# Patient Record
Sex: FEMALE | Race: WHITE | URBAN - METROPOLITAN AREA
[De-identification: names, ages, dates, MRNs, and addresses within clinical notes are randomized per-mention and may not be internally consistent; named-entity substitution may affect disease eponyms.]

---

## 2019-08-01 ENCOUNTER — EMERGENCY (EMERGENCY)
Facility: HOSPITAL | Age: 84
LOS: 0 days | Discharge: HOME | End: 2019-08-02
Attending: EMERGENCY MEDICINE
Payer: MEDICARE

## 2019-08-01 VITALS
HEART RATE: 72 BPM | TEMPERATURE: 97 F | RESPIRATION RATE: 18 BRPM | WEIGHT: 134.92 LBS | OXYGEN SATURATION: 98 % | HEIGHT: 60 IN | SYSTOLIC BLOOD PRESSURE: 153 MMHG | DIASTOLIC BLOOD PRESSURE: 65 MMHG

## 2019-08-01 DIAGNOSIS — Z88.0 ALLERGY STATUS TO PENICILLIN: ICD-10-CM

## 2019-08-01 DIAGNOSIS — Z86.73 PERSONAL HISTORY OF TRANSIENT ISCHEMIC ATTACK (TIA), AND CEREBRAL INFARCTION WITHOUT RESIDUAL DEFICITS: ICD-10-CM

## 2019-08-01 DIAGNOSIS — S01.81XA LACERATION WITHOUT FOREIGN BODY OF OTHER PART OF HEAD, INITIAL ENCOUNTER: ICD-10-CM

## 2019-08-01 DIAGNOSIS — E78.5 HYPERLIPIDEMIA, UNSPECIFIED: ICD-10-CM

## 2019-08-01 DIAGNOSIS — Z79.01 LONG TERM (CURRENT) USE OF ANTICOAGULANTS: ICD-10-CM
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
## 2019-08-01 DIAGNOSIS — E11.9 TYPE 2 DIABETES MELLITUS WITHOUT COMPLICATIONS: ICD-10-CM

## 2019-08-01 DIAGNOSIS — Z79.82 LONG TERM (CURRENT) USE OF ASPIRIN: ICD-10-CM
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
## 2019-08-01 DIAGNOSIS — F03.90 UNSPECIFIED DEMENTIA WITHOUT BEHAVIORAL DISTURBANCE: ICD-10-CM

## 2019-08-01 DIAGNOSIS — M50.30 OTHER CERVICAL DISC DEGENERATION, UNSPECIFIED CERVICAL REGION: ICD-10-CM

## 2019-08-01 DIAGNOSIS — S09.90XA UNSPECIFIED INJURY OF HEAD, INITIAL ENCOUNTER: ICD-10-CM

## 2019-08-01 DIAGNOSIS — I67.89 OTHER CEREBROVASCULAR DISEASE: ICD-10-CM
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
## 2019-08-01 LAB
APPEARANCE UR: CLEAR — SIGNIFICANT CHANGE UP
BILIRUB UR-MCNC: NEGATIVE — SIGNIFICANT CHANGE UP
COLOR SPEC: YELLOW — SIGNIFICANT CHANGE UP
DIFF PNL FLD: NEGATIVE — SIGNIFICANT CHANGE UP
GLUCOSE UR QL: NEGATIVE MG/DL — SIGNIFICANT CHANGE UP
KETONES UR-MCNC: NEGATIVE — SIGNIFICANT CHANGE UP
LEUKOCYTE ESTERASE UR-ACNC: NEGATIVE — SIGNIFICANT CHANGE UP
NITRITE UR-MCNC: NEGATIVE — SIGNIFICANT CHANGE UP
PH UR: 5 — SIGNIFICANT CHANGE UP (ref 5–8)
PROT UR-MCNC: NEGATIVE MG/DL — SIGNIFICANT CHANGE UP
SP GR SPEC: 1.03 — HIGH (ref 1.01–1.02)
UROBILINOGEN FLD QL: NEGATIVE MG/DL — SIGNIFICANT CHANGE UP

## 2019-08-01 PROCEDURE — 99285 EMERGENCY DEPT VISIT HI MDM: CPT

## 2019-08-01 PROCEDURE — 71045 X-RAY EXAM CHEST 1 VIEW: CPT | Mod: 26

## 2019-08-01 PROCEDURE — 72170 X-RAY EXAM OF PELVIS: CPT | Mod: 26

## 2019-08-01 PROCEDURE — 93010 ELECTROCARDIOGRAM REPORT: CPT

## 2019-08-01 PROCEDURE — 72125 CT NECK SPINE W/O DYE: CPT | Mod: 26

## 2019-08-01 PROCEDURE — 70450 CT HEAD/BRAIN W/O DYE: CPT | Mod: 26

## 2019-08-01 PROCEDURE — 99053 MED SERV 10PM-8AM 24 HR FAC: CPT

## 2019-08-01 NOTE — ED ADULT TRIAGE NOTE - CHIEF COMPLAINT QUOTE
pt BIBEMS from [facility] s/p mechanical fall + head strike + laceration to forehead, denies LOC. witnessed by staff. pt on eliquis/asa. baseline dementia, hx cva. trauma alert activated at 1750

## 2019-08-01 NOTE — ED ADULT NURSE NOTE - CHIEF COMPLAINT QUOTE
pt BIBEMS from [facility] s/p mechanical fall + head strike + laceration to forehead, denies LOC. witnessed by staff. pt on eliquis/asa. baseline dementia, hx cva. trauma alert activated at 1750
Patient

## 2019-08-01 NOTE — ED PROVIDER NOTE - CARE PLAN
Principal Discharge DX:	Forehead laceration Principal Discharge DX:	Forehead laceration  Secondary Diagnosis:	Head trauma

## 2019-08-01 NOTE — ED PROVIDER NOTE - CARE PROVIDER_API CALL
Saleem Grewal)  Plastic Surgery; Surgery of the Hand  [address] [place] [address], [address]  [place] [address]  Phone: [phone]  Fax: [phone]  Follow Up Time:

## 2019-08-01 NOTE — ED PROVIDER NOTE - OBJECTIVE STATEMENT
89 y/o female with a PMHx of dementia presents to the ED s/p mechanical fall. 89 y/o female with a PMHx of dementia, HLD, DM, s/p CVA presents to the ED s/p mechanical fall. As per EMS, pt hit head. Laceration to head. Pt states she does not remember how she fell. On Eliquis and ASA.

## 2019-08-01 NOTE — ED PROVIDER NOTE - PROGRESS NOTE DETAILS
Mandi Malhotra for ED Attending Dr. Perez: Plastics consulted for head lac Mandi Malhotra for ED Attending Dr. Perez: Spoke to Dr. Suazo [employer], she states she is not credentialed at  but will attempt to find a plastic surgeon to come in Case discussed with Dr. Grewal(Plastic). He will come to see the patient. Pt. endorsed to Dr. Cheney. Spoke to Mikal Cordoba(BLAIR). He states that the patient fell out of her wheelchair and fell forward. It was witness. NO LOC. Pt. signed out to Dr. Cheney. pt wound repaired by plastics.  CBC stable (lost decent amt of blood from facial wound while in ED).  will dc back to facility

## 2019-08-01 NOTE — ED PROVIDER NOTE - MUSCULOSKELETAL, MLM
Spine appears normal, range of motion is not limited, no muscle or joint tenderness. Full ROM ot LE. Pelvis is stable.

## 2019-08-01 NOTE — ED ADULT NURSE NOTE - NSIMPLEMENTINTERV_GEN_ALL_ED
Implemented All Fall with Harm Risk Interventions:  [place] to call system. Call bell, personal items and telephone within reach. Instruct patient to call for assistance. Room bathroom lighting operational. Non-slip footwear when patient is off stretcher. Physically safe environment: no spills, clutter or unnecessary equipment. Stretcher in lowest position, wheels locked, appropriate side rails in place. Provide visual cue, wrist band, yellow gown, etc. Monitor gait and stability. Monitor for mental status changes and reorient to person, place, and time. Review medications for side effects contributing to fall risk. Reinforce activity limits and safety measures with patient and family. Provide visual clues: red socks.

## 2019-08-02 VITALS
SYSTOLIC BLOOD PRESSURE: 118 MMHG | OXYGEN SATURATION: 100 % | TEMPERATURE: 98 F | HEART RATE: 73 BPM | DIASTOLIC BLOOD PRESSURE: 64 MMHG | RESPIRATION RATE: 18 BRPM

## 2019-08-02 LAB
HCT VFR BLD CALC: 36.9 % — SIGNIFICANT CHANGE UP (ref 34.5–45)
HGB BLD-MCNC: 12 G/DL — SIGNIFICANT CHANGE UP (ref 11.5–15.5)
MCHC RBC-ENTMCNC: 30.4 PG — SIGNIFICANT CHANGE UP (ref 27–34)
MCHC RBC-ENTMCNC: 32.5 GM/DL — SIGNIFICANT CHANGE UP (ref 32–36)
MCV RBC AUTO: 93.4 FL — SIGNIFICANT CHANGE UP (ref 80–100)
PLATELET # BLD AUTO: 312 K/UL — SIGNIFICANT CHANGE UP (ref 150–400)
RBC # BLD: 3.95 M/UL — SIGNIFICANT CHANGE UP (ref 3.8–5.2)
RBC # FLD: 12.6 % — SIGNIFICANT CHANGE UP (ref 10.3–14.5)
WBC # BLD: 12.08 K/UL — HIGH (ref 3.8–10.5)
WBC # FLD AUTO: 12.08 K/UL — HIGH (ref 3.8–10.5)

## 2019-08-02 RX ORDER — CEFAZOLIN SODIUM 1 G
1000 VIAL (EA) INJECTION ONCE
Refills: 0 | Status: DISCONTINUED | OUTPATIENT
Start: 2019-08-02 | End: 2019-08-02

## 2019-08-02 RX ORDER — CEFAZOLIN SODIUM 1 G
1000 VIAL (EA) INJECTION ONCE
Refills: 0 | Status: COMPLETED | OUTPATIENT
Start: 2019-08-02 | End: 2019-08-02

## 2019-08-02 RX ADMIN — Medication 1000 MILLIGRAM(S): at 01:34

## 2019-10-05 ENCOUNTER — INPATIENT (INPATIENT)
Facility: HOSPITAL | Age: 84
LOS: 2 days | Discharge: HOSPICE | DRG: 871 | End: 2019-10-08
Attending: HOSPITALIST | Admitting: INTERNAL MEDICINE
Payer: MEDICARE

## 2019-10-05 VITALS
WEIGHT: 134.92 LBS | OXYGEN SATURATION: 94 % | RESPIRATION RATE: 19 BRPM | HEIGHT: 66 IN | DIASTOLIC BLOOD PRESSURE: 37 MMHG | SYSTOLIC BLOOD PRESSURE: 118 MMHG | HEART RATE: 99 BPM

## 2019-10-05 DIAGNOSIS — J18.9 PNEUMONIA, UNSPECIFIED ORGANISM: ICD-10-CM

## 2019-10-05 PROBLEM — E78.5 HYPERLIPIDEMIA, UNSPECIFIED: Chronic | Status: ACTIVE | Noted: 2019-08-01

## 2019-10-05 PROBLEM — F03.90 UNSPECIFIED DEMENTIA WITHOUT BEHAVIORAL DISTURBANCE: Chronic | Status: ACTIVE | Noted: 2019-08-01

## 2019-10-05 PROBLEM — E11.9 TYPE 2 DIABETES MELLITUS WITHOUT COMPLICATIONS: Chronic | Status: ACTIVE | Noted: 2019-08-01

## 2019-10-05 LAB
ALBUMIN SERPL ELPH-MCNC: 1.6 G/DL — LOW (ref 3.3–5)
ALP SERPL-CCNC: 79 U/L — SIGNIFICANT CHANGE UP (ref 40–120)
ALT FLD-CCNC: 29 U/L — SIGNIFICANT CHANGE UP (ref 12–78)
ANION GAP SERPL CALC-SCNC: 10 MMOL/L — SIGNIFICANT CHANGE UP (ref 5–17)
APPEARANCE UR: ABNORMAL
APTT BLD: 28.9 SEC — SIGNIFICANT CHANGE UP (ref 27.5–36.3)
AST SERPL-CCNC: 43 U/L — HIGH (ref 15–37)
BASE EXCESS BLDA CALC-SCNC: -2.2 MMOL/L — LOW (ref -2–2)
BASOPHILS # BLD AUTO: 0 K/UL — SIGNIFICANT CHANGE UP (ref 0–0.2)
BASOPHILS # BLD AUTO: 0.01 K/UL — SIGNIFICANT CHANGE UP (ref 0–0.2)
BASOPHILS NFR BLD AUTO: 0 % — SIGNIFICANT CHANGE UP (ref 0–2)
BASOPHILS NFR BLD AUTO: 0.1 % — SIGNIFICANT CHANGE UP (ref 0–2)
BILIRUB SERPL-MCNC: 0.3 MG/DL — SIGNIFICANT CHANGE UP (ref 0.2–1.2)
BILIRUB UR-MCNC: NEGATIVE — SIGNIFICANT CHANGE UP
BLOOD GAS COMMENTS ARTERIAL: SIGNIFICANT CHANGE UP
BLOOD GAS COMMENTS ARTERIAL: SIGNIFICANT CHANGE UP
BUN SERPL-MCNC: 62 MG/DL — HIGH (ref 7–23)
CALCIUM SERPL-MCNC: 8.9 MG/DL — SIGNIFICANT CHANGE UP (ref 8.5–10.1)
CHLORIDE SERPL-SCNC: 108 MMOL/L — SIGNIFICANT CHANGE UP (ref 96–108)
CO2 SERPL-SCNC: 24 MMOL/L — SIGNIFICANT CHANGE UP (ref 22–31)
COLOR SPEC: YELLOW — SIGNIFICANT CHANGE UP
CREAT SERPL-MCNC: 1.55 MG/DL — HIGH (ref 0.5–1.3)
DIFF PNL FLD: ABNORMAL
EOSINOPHIL # BLD AUTO: 0 K/UL — SIGNIFICANT CHANGE UP (ref 0–0.5)
EOSINOPHIL # BLD AUTO: 0 K/UL — SIGNIFICANT CHANGE UP (ref 0–0.5)
EOSINOPHIL NFR BLD AUTO: 0 % — SIGNIFICANT CHANGE UP (ref 0–6)
EOSINOPHIL NFR BLD AUTO: 0 % — SIGNIFICANT CHANGE UP (ref 0–6)
GAS PNL BLDA: SIGNIFICANT CHANGE UP
GLUCOSE SERPL-MCNC: 181 MG/DL — HIGH (ref 70–99)
GLUCOSE UR QL: NEGATIVE MG/DL — SIGNIFICANT CHANGE UP
GRAM STN FLD: SIGNIFICANT CHANGE UP
HCO3 BLDA-SCNC: 20 MMOL/L — LOW (ref 21–29)
HCT VFR BLD CALC: 34.9 % — SIGNIFICANT CHANGE UP (ref 34.5–45)
HCT VFR BLD CALC: 36.1 % — SIGNIFICANT CHANGE UP (ref 34.5–45)
HGB BLD-MCNC: 11.1 G/DL — LOW (ref 11.5–15.5)
HGB BLD-MCNC: 11.6 G/DL — SIGNIFICANT CHANGE UP (ref 11.5–15.5)
IMM GRANULOCYTES NFR BLD AUTO: 2.6 % — HIGH (ref 0–1.5)
INR BLD: 1.54 RATIO — HIGH (ref 0.88–1.16)
KETONES UR-MCNC: NEGATIVE — SIGNIFICANT CHANGE UP
LACTATE SERPL-SCNC: 3.5 MMOL/L — HIGH (ref 0.7–2)
LEUKOCYTE ESTERASE UR-ACNC: ABNORMAL
LYMPHOCYTES # BLD AUTO: 0.39 K/UL — LOW (ref 1–3.3)
LYMPHOCYTES # BLD AUTO: 0.44 K/UL — LOW (ref 1–3.3)
LYMPHOCYTES # BLD AUTO: 4.9 % — LOW (ref 13–44)
LYMPHOCYTES # BLD AUTO: 5 % — LOW (ref 13–44)
MCHC RBC-ENTMCNC: 29.7 PG — SIGNIFICANT CHANGE UP (ref 27–34)
MCHC RBC-ENTMCNC: 29.7 PG — SIGNIFICANT CHANGE UP (ref 27–34)
MCHC RBC-ENTMCNC: 31.8 GM/DL — LOW (ref 32–36)
MCHC RBC-ENTMCNC: 32.1 GM/DL — SIGNIFICANT CHANGE UP (ref 32–36)
MCV RBC AUTO: 92.3 FL — SIGNIFICANT CHANGE UP (ref 80–100)
MCV RBC AUTO: 93.3 FL — SIGNIFICANT CHANGE UP (ref 80–100)
MONOCYTES # BLD AUTO: 0.1 K/UL — SIGNIFICANT CHANGE UP (ref 0–0.9)
MONOCYTES # BLD AUTO: 0.16 K/UL — SIGNIFICANT CHANGE UP (ref 0–0.9)
MONOCYTES NFR BLD AUTO: 1.1 % — LOW (ref 2–14)
MONOCYTES NFR BLD AUTO: 2 % — SIGNIFICANT CHANGE UP (ref 2–14)
NEUTROPHILS # BLD AUTO: 7.21 K/UL — SIGNIFICANT CHANGE UP (ref 1.8–7.4)
NEUTROPHILS # BLD AUTO: 8.12 K/UL — HIGH (ref 1.8–7.4)
NEUTROPHILS NFR BLD AUTO: 48 % — SIGNIFICANT CHANGE UP (ref 43–77)
NEUTROPHILS NFR BLD AUTO: 91.3 % — HIGH (ref 43–77)
NITRITE UR-MCNC: NEGATIVE — SIGNIFICANT CHANGE UP
NRBC # BLD: SIGNIFICANT CHANGE UP /100 WBCS (ref 0–0)
PCO2 BLDA: 30 MMHG — LOW (ref 32–46)
PH BLDA: 7.45 — SIGNIFICANT CHANGE UP (ref 7.35–7.45)
PH UR: 7 — SIGNIFICANT CHANGE UP (ref 5–8)
PLATELET # BLD AUTO: 291 K/UL — SIGNIFICANT CHANGE UP (ref 150–400)
PLATELET # BLD AUTO: 353 K/UL — SIGNIFICANT CHANGE UP (ref 150–400)
PO2 BLDA: 62 MMHG — LOW (ref 74–108)
POTASSIUM SERPL-MCNC: 4.5 MMOL/L — SIGNIFICANT CHANGE UP (ref 3.5–5.3)
POTASSIUM SERPL-SCNC: 4.5 MMOL/L — SIGNIFICANT CHANGE UP (ref 3.5–5.3)
PROT SERPL-MCNC: 6.1 GM/DL — SIGNIFICANT CHANGE UP (ref 6–8.3)
PROT UR-MCNC: 100 MG/DL
PROTHROM AB SERPL-ACNC: 17.3 SEC — HIGH (ref 10–12.9)
RAPID RVP RESULT: SIGNIFICANT CHANGE UP
RBC # BLD: 3.74 M/UL — LOW (ref 3.8–5.2)
RBC # BLD: 3.91 M/UL — SIGNIFICANT CHANGE UP (ref 3.8–5.2)
RBC # FLD: 14.2 % — SIGNIFICANT CHANGE UP (ref 10.3–14.5)
RBC # FLD: 14.2 % — SIGNIFICANT CHANGE UP (ref 10.3–14.5)
SAO2 % BLDA: 90 % — LOW (ref 92–96)
SODIUM SERPL-SCNC: 142 MMOL/L — SIGNIFICANT CHANGE UP (ref 135–145)
SP GR SPEC: 1 — LOW (ref 1.01–1.02)
SPECIMEN SOURCE: SIGNIFICANT CHANGE UP
UROBILINOGEN FLD QL: NEGATIVE MG/DL — SIGNIFICANT CHANGE UP
WBC # BLD: 7.75 K/UL — SIGNIFICANT CHANGE UP (ref 3.8–10.5)
WBC # BLD: 8.9 K/UL — SIGNIFICANT CHANGE UP (ref 3.8–10.5)
WBC # FLD AUTO: 7.75 K/UL — SIGNIFICANT CHANGE UP (ref 3.8–10.5)
WBC # FLD AUTO: 8.9 K/UL — SIGNIFICANT CHANGE UP (ref 3.8–10.5)

## 2019-10-05 PROCEDURE — 93010 ELECTROCARDIOGRAM REPORT: CPT

## 2019-10-05 PROCEDURE — 36600 WITHDRAWAL OF ARTERIAL BLOOD: CPT

## 2019-10-05 PROCEDURE — 94640 AIRWAY INHALATION TREATMENT: CPT

## 2019-10-05 PROCEDURE — 82803 BLOOD GASES ANY COMBINATION: CPT

## 2019-10-05 PROCEDURE — 85027 COMPLETE CBC AUTOMATED: CPT

## 2019-10-05 PROCEDURE — 83605 ASSAY OF LACTIC ACID: CPT

## 2019-10-05 PROCEDURE — 94660 CPAP INITIATION&MGMT: CPT

## 2019-10-05 PROCEDURE — 85025 COMPLETE CBC W/AUTO DIFF WBC: CPT

## 2019-10-05 PROCEDURE — 80202 ASSAY OF VANCOMYCIN: CPT

## 2019-10-05 PROCEDURE — 36415 COLL VENOUS BLD VENIPUNCTURE: CPT

## 2019-10-05 PROCEDURE — 93306 TTE W/DOPPLER COMPLETE: CPT

## 2019-10-05 PROCEDURE — 71045 X-RAY EXAM CHEST 1 VIEW: CPT | Mod: 26

## 2019-10-05 PROCEDURE — 80048 BASIC METABOLIC PNL TOTAL CA: CPT

## 2019-10-05 PROCEDURE — 71250 CT THORAX DX C-: CPT | Mod: 26

## 2019-10-05 RX ORDER — LATANOPROST 0.05 MG/ML
1 SOLUTION/ DROPS OPHTHALMIC; TOPICAL AT BEDTIME
Refills: 0 | Status: DISCONTINUED | OUTPATIENT
Start: 2019-10-05 | End: 2019-10-07

## 2019-10-05 RX ORDER — ASPIRIN/CALCIUM CARB/MAGNESIUM 324 MG
81 TABLET ORAL DAILY
Refills: 0 | Status: DISCONTINUED | OUTPATIENT
Start: 2019-10-05 | End: 2019-10-07

## 2019-10-05 RX ORDER — ACETAMINOPHEN 500 MG
2 TABLET ORAL
Qty: 0 | Refills: 0 | DISCHARGE

## 2019-10-05 RX ORDER — ACETAMINOPHEN 500 MG
650 TABLET ORAL EVERY 6 HOURS
Refills: 0 | Status: DISCONTINUED | OUTPATIENT
Start: 2019-10-05 | End: 2019-10-08

## 2019-10-05 RX ORDER — APIXABAN 2.5 MG/1
5 TABLET, FILM COATED ORAL
Refills: 0 | Status: DISCONTINUED | OUTPATIENT
Start: 2019-10-05 | End: 2019-10-07

## 2019-10-05 RX ORDER — AZTREONAM 2 G
1000 VIAL (EA) INJECTION ONCE
Refills: 0 | Status: COMPLETED | OUTPATIENT
Start: 2019-10-05 | End: 2019-10-05

## 2019-10-05 RX ORDER — VANCOMYCIN HCL 1 G
1000 VIAL (EA) INTRAVENOUS ONCE
Refills: 0 | Status: COMPLETED | OUTPATIENT
Start: 2019-10-05 | End: 2019-10-05

## 2019-10-05 RX ORDER — ATENOLOL 25 MG/1
25 TABLET ORAL DAILY
Refills: 0 | Status: DISCONTINUED | OUTPATIENT
Start: 2019-10-05 | End: 2019-10-07

## 2019-10-05 RX ORDER — SODIUM CHLORIDE 9 MG/ML
1500 INJECTION INTRAMUSCULAR; INTRAVENOUS; SUBCUTANEOUS ONCE
Refills: 0 | Status: COMPLETED | OUTPATIENT
Start: 2019-10-05 | End: 2019-10-05

## 2019-10-05 RX ORDER — TRAMADOL HYDROCHLORIDE 50 MG/1
50 TABLET ORAL DAILY
Refills: 0 | Status: DISCONTINUED | OUTPATIENT
Start: 2019-10-05 | End: 2019-10-08

## 2019-10-05 RX ORDER — SERTRALINE 25 MG/1
50 TABLET, FILM COATED ORAL DAILY
Refills: 0 | Status: DISCONTINUED | OUTPATIENT
Start: 2019-10-05 | End: 2019-10-07

## 2019-10-05 RX ORDER — IPRATROPIUM/ALBUTEROL SULFATE 18-103MCG
3 AEROSOL WITH ADAPTER (GRAM) INHALATION ONCE
Refills: 0 | Status: COMPLETED | OUTPATIENT
Start: 2019-10-05 | End: 2019-10-06

## 2019-10-05 RX ORDER — SODIUM CHLORIDE 9 MG/ML
500 INJECTION INTRAMUSCULAR; INTRAVENOUS; SUBCUTANEOUS ONCE
Refills: 0 | Status: COMPLETED | OUTPATIENT
Start: 2019-10-05 | End: 2019-10-05

## 2019-10-05 RX ORDER — IPRATROPIUM/ALBUTEROL SULFATE 18-103MCG
3 AEROSOL WITH ADAPTER (GRAM) INHALATION EVERY 6 HOURS
Refills: 0 | Status: DISCONTINUED | OUTPATIENT
Start: 2019-10-05 | End: 2019-10-08

## 2019-10-05 RX ORDER — IPRATROPIUM/ALBUTEROL SULFATE 18-103MCG
3 AEROSOL WITH ADAPTER (GRAM) INHALATION ONCE
Refills: 0 | Status: DISCONTINUED | OUTPATIENT
Start: 2019-10-05 | End: 2019-10-08

## 2019-10-05 RX ORDER — SODIUM CHLORIDE 9 MG/ML
500 INJECTION INTRAMUSCULAR; INTRAVENOUS; SUBCUTANEOUS
Refills: 0 | Status: COMPLETED | OUTPATIENT
Start: 2019-10-05 | End: 2019-10-05

## 2019-10-05 RX ORDER — ALBUTEROL 90 UG/1
1 AEROSOL, METERED ORAL EVERY 4 HOURS
Refills: 0 | Status: DISCONTINUED | OUTPATIENT
Start: 2019-10-05 | End: 2019-10-08

## 2019-10-05 RX ORDER — ACETAMINOPHEN 500 MG
1000 TABLET ORAL ONCE
Refills: 0 | Status: COMPLETED | OUTPATIENT
Start: 2019-10-05 | End: 2019-10-05

## 2019-10-05 RX ADMIN — Medication 50 MILLIGRAM(S): at 13:30

## 2019-10-05 RX ADMIN — Medication 3 MILLILITER(S): at 17:26

## 2019-10-05 RX ADMIN — Medication 400 MILLIGRAM(S): at 21:44

## 2019-10-05 RX ADMIN — Medication 250 MILLIGRAM(S): at 14:15

## 2019-10-05 RX ADMIN — SODIUM CHLORIDE 150 MILLILITER(S): 9 INJECTION INTRAMUSCULAR; INTRAVENOUS; SUBCUTANEOUS at 22:03

## 2019-10-05 RX ADMIN — SODIUM CHLORIDE 1500 MILLILITER(S): 9 INJECTION INTRAMUSCULAR; INTRAVENOUS; SUBCUTANEOUS at 13:20

## 2019-10-05 RX ADMIN — Medication 3 MILLILITER(S): at 20:09

## 2019-10-05 NOTE — H&P ADULT - HISTORY OF PRESENT ILLNESS
HPI:The patient is a 89 yo female with Hx. of Dementia, CVA, DM, HLD, DVT who was sent to the ED from [facility] for severe dyspnea, hypoxia, lethargy, O2 Sat 74%. The patient was diagnosed with LLL pneumonia on CT and referred for inpatient IV AB.      PMHx:  Dementia, CVA, DM, HLD, DVT    PSHx: N/A     Family Hx: not obtainable secondary to dementia.    Social Hx.: not smoking, no alcohol use    ROS: not obtainable secondary to dementia.        Physical Exam: Vital Signs Last 24 Hrs  T(C): 37.6 (05 Oct 2019 12:30), Max: 37.6 (05 Oct 2019 12:30)  T(F): 99.7 (05 Oct 2019 12:30), Max: 99.7 (05 Oct 2019 12:30)  HR: 82 (05 Oct 2019 17:26) (82 - 99)  BP: 150/64 (05 Oct 2019 17:26) (118/37 - 150/64)  BP(mean): --  RR: 21 (05 Oct 2019 17:26) (19 - 22)  SpO2: 94% (05 Oct 2019 17:26) (92% - 96%)        HEENT: PRRL EOMI    MOUTH/TEETH/GUMS: Clear    NECK: no JVD    LUNGS: decreased BS at bases, rhonchi,     HEART: S1,S2 RR    ABDOMEN: soft nontender    EXTREMITIES:  no pedal edema    MUSCULOSKELETAL: arthritic changes.     NEURO: no tremor, no focal signs.    SKIN: no rash    : CVA negative,     Lab:                       11.6   7.75  )-----------( 353      ( 05 Oct 2019 13:47 )             36.1       10-05    142  |  108  |  62<H>  ----------------------------<  181<H>  4.5   |  24  |  1.55<H>    Ca    8.9      05 Oct 2019 13:47    TPro  6.1  /  Alb  1.6<L>  /  TBili  0.3  /  DBili  x   /  AST  43<H>  /  ALT  29  /  AlkPhos  79  10-05    UA 11-25 wbc,  RVP neg,     CT Chest : Extensive consolidation of the left lung with more mild   consolidation at the right lung base. This could represent extensive   pneumonia. A more aggressive process such as underlying abscess or   perhaps necrosis would be difficult to exclude on the left given the lack   of IV contrast.

## 2019-10-05 NOTE — ED ADULT NURSE NOTE - CHIEF COMPLAINT QUOTE
pt BIBEMS from [facility] for eval of hypoxia, 78% on RA. pt arrives to ED on NRB 94%. pt w/ dementia, hx CVA. lowgrade temp at NH 99.8. at triage unable to obtain oral temp, will perform rectal in room. transfer paperwork lists pt as DNR but no MOLST transferred w/ pt.

## 2019-10-05 NOTE — PROGRESS NOTE ADULT - SUBJECTIVE AND OBJECTIVE BOX
Pt was seen and examined at bedside as RN called to eval. for Hypoxia O2 sat: 86-88 % on Ventimask 50%.   Pt si admitted with LLL PNA, received IV Aztreonam and IV Vancomycin in the ED.   Pt came from ED with Ventimask and sating in high 80's. While in the ED, pt was on NRB sating 94-96%.   Duoneb stat given. ABG stat.     CT Chest in [facility] ED: Extensive consolidation of the left lung with more mild   consolidation at the right lung base. This could represent extensive   pneumonia. A more aggressive process such as underlying abscess or   perhaps necrosis would be difficult to exclude on the left given the lack   of IV contrast.    Vital Signs Last 24 Hrs  T(C): 37.1 (05 Oct 2019 20:00), Max: 37.6 (05 Oct 2019 12:30)  T(F): 98.7 (05 Oct 2019 20:00), Max: 99.7 (05 Oct 2019 12:30)  HR: 88 (05 Oct 2019 20:00) (82 - 99)  BP: 135/54 (05 Oct 2019 20:00) (118/37 - 150/64)  RR: 19 (05 Oct 2019 20:00) (19 - 22)  SpO2: 88% (05 Oct 2019 20:00) (88% - 96%)    Exam:   General: resting, confused  Lungs: b/l rhonchi  LE: no edema    A/P:    # B/L PNA  - C/w IV Abx  - ABG stat  - C/w Oxygen  - Tylenol IVPB x 1 stat  - will start on IV fluid - pt received 1.5 L bolus in the ED  - waiting for ABG result......      Plan d/w RN Pt was seen and examined at bedside as RN called to eval. for Hypoxia O2 sat: 86-88 % on Ventimask 50%.   Pt si admitted with LLL PNA, received IV Aztreonam and IV Vancomycin in the ED.   Pt came from ED with Ventimask and sating in high 80's. While in the ED, pt was on NRB sating 94-96%.   Duoneb stat given. ABG stat.     CT Chest in [facility] ED: Extensive consolidation of the left lung with more mild   consolidation at the right lung base. This could represent extensive   pneumonia. A more aggressive process such as underlying abscess or   perhaps necrosis would be difficult to exclude on the left given the lack   of IV contrast.    Vital Signs Last 24 Hrs  T(C): 37.1 (05 Oct 2019 20:00), Max: 37.6 (05 Oct 2019 12:30)  T(F): 98.7 (05 Oct 2019 20:00), Max: 99.7 (05 Oct 2019 12:30)  HR: 88 (05 Oct 2019 20:00) (82 - 99)  BP: 135/54 (05 Oct 2019 20:00) (118/37 - 150/64)  RR: 19 (05 Oct 2019 20:00) (19 - 22)  SpO2: 88% (05 Oct 2019 20:00) (88% - 96%)    Exam:   General: resting, confused  Lungs: b/l rhonchi  LE: no edema    A/P:    # B/L PNA  - C/w IV Abx  - ABG stat  - C/w Oxygen  - Tylenol IVPB x 1 stat  - will start on IV fluid - pt received 1.5 L bolus in the ED  - waiting for ABG result......  - c/w Nebs      # DVT ppx  - Pt si on Eliquis       Plan d/w MICKY Pt was seen and examined at bedside as RN called to eval. for Hypoxia O2 sat: 86-88 % on Ventimask 50%.   Pt si admitted with LLL PNA, received IV Aztreonam and IV Vancomycin in the ED.   Pt came from ED to  with Ventimask and sating in high 80's. While in the ED, pt was on NRB sating 94-96%.   Duoneb stat given. ABG stat.     89 yo female with Hx. of Dementia, CVA, DM, HLD, DVT who was sent to the ED from [facility] for severe dyspnea, hypoxia, lethargy, O2 Sat 74%. The patient was diagnosed with LLL pneumonia on CT and referred for inpatient IV AB.    CT Chest in the ED: Extensive consolidation of the left lung with more mild   consolidation at the right lung base. This could represent extensive   pneumonia. A more aggressive process such as underlying abscess or   perhaps necrosis would be difficult to exclude on the left given the lack   of IV contrast.    Vital Signs Last 24 Hrs  T(C): 37.1 (05 Oct 2019 20:00), Max: 37.6 (05 Oct 2019 12:30)  T(F): 98.7 (05 Oct 2019 20:00), Max: 99.7 (05 Oct 2019 12:30)  HR: 88 (05 Oct 2019 20:00) (82 - 99)  BP: 135/54 (05 Oct 2019 20:00) (118/37 - 150/64)  RR: 19 (05 Oct 2019 20:00) (19 - 22)  SpO2: 88% (05 Oct 2019 20:00) (88% - 96%)    Exam:   General: resting, confused  Lungs: b/l rhonchi  LE: no edema    A/P:    # B/L PNA  - C/w IV Abx  - ABG stat - results reviewed with dr. Joy and BiPAP ordered, will check  if patient tolerates    - Tylenol IVPB x 1 stat  - will start on IV fluid - pt received 1.5 L bolus in the ED  - c/w Nebs  - Monitor vitals and pulse ox  - Will repeat lactate    # DVT ppx  - Pt si on Eliquis       Plan d/w RN     Case d/w Dr. Joy Pt was seen and examined at bedside as RN called to eval. for Hypoxia O2 sat: 86-88 % on Ventimask 50%.   Pt si admitted with LLL PNA, received IV Aztreonam and IV Vancomycin in the ED.   Pt came from ED to  with Ventimask and sating in high 80's. While in the ED, pt was on NRB sating 94-96%.   Duoneb stat given. ABG stat.     89 yo female with Hx. of Dementia, CVA, DM, HLD, DVT who was sent to the ED from [facility] for severe dyspnea, hypoxia, lethargy, O2 Sat 74%. The patient was diagnosed with LLL pneumonia on CT and referred for inpatient IV AB.    CT Chest in the ED: Extensive consolidation of the left lung with more mild   consolidation at the right lung base. This could represent extensive   pneumonia. A more aggressive process such as underlying abscess or   perhaps necrosis would be difficult to exclude on the left given the lack   of IV contrast.    Vital Signs Last 24 Hrs  T(C): 37.1 (05 Oct 2019 20:00), Max: 37.6 (05 Oct 2019 12:30)  T(F): 98.7 (05 Oct 2019 20:00), Max: 99.7 (05 Oct 2019 12:30)  HR: 88 (05 Oct 2019 20:00) (82 - 99)  BP: 135/54 (05 Oct 2019 20:00) (118/37 - 150/64)  RR: 19 (05 Oct 2019 20:00) (19 - 22)  SpO2: 88% (05 Oct 2019 20:00) (88% - 96%)    Exam:   General: resting, confused  Lungs: b/l rhonchi  LE: no edema    A/P:    # B/L PNA  - C/w IV Abx  - ABG stat - results reviewed with dr. Joy and BiPAP ordered, will check  if patient tolerates    - Tylenol IVPB x 1 stat  - will start on IV fluid - pt received 1.5 L bolus in the ED  - c/w Nebs  - Monitor vitals and pulse ox  - Will repeat lactate    # DVT ppx  - Pt si on Eliquis       Plan d/w RN     Case d/w Dr. Joy     Addendum:  S/p BiPAP sat is still in 80%, d/w RN to call respi. therapist to adjust setting to keep o2 sat atleast 90 %.   I Spoke to Pt's Son Francisco Campos HCP overnight around 23:40 and he confirmed over the phone that Pt is DNR/DNI.   C/w IVF Bolus and then maintenance  Repeat ABG and lactate check  Monitor vitals    Above d/w Dr. Joy Pt was seen and examined at bedside as RN called to eval. for Hypoxia O2 sat: 86-88 % on Ventimask 50%.   Pt si admitted with LLL PNA, received IV Aztreonam and IV Vancomycin in the ED.   Pt came from ED to  with Ventimask and sating in high 80's. While in the ED, pt was on NRB sating 94-96%.   Duoneb stat given. ABG stat.     89 yo female with Hx. of Dementia, CVA, DM, HLD, DVT who was sent to the ED from [facility] for severe dyspnea, hypoxia, lethargy, O2 Sat 74%. The patient was diagnosed with LLL pneumonia on CT and referred for inpatient IV AB.    CT Chest in the ED: Extensive consolidation of the left lung with more mild   consolidation at the right lung base. This could represent extensive   pneumonia. A more aggressive process such as underlying abscess or   perhaps necrosis would be difficult to exclude on the left given the lack   of IV contrast.    Vital Signs Last 24 Hrs  T(C): 37.1 (05 Oct 2019 20:00), Max: 37.6 (05 Oct 2019 12:30)  T(F): 98.7 (05 Oct 2019 20:00), Max: 99.7 (05 Oct 2019 12:30)  HR: 88 (05 Oct 2019 20:00) (82 - 99)  BP: 135/54 (05 Oct 2019 20:00) (118/37 - 150/64)  RR: 19 (05 Oct 2019 20:00) (19 - 22)  SpO2: 88% (05 Oct 2019 20:00) (88% - 96%)    Exam:   General: resting, confused  Lungs: b/l rhonchi  LE: no edema    A/P:    # B/L PNA  # Acute Respi. failure  # Elevated Lactate  - C/w IV Abx  - ABG stat - results reviewed with dr. Joy and BiPAP ordered, will check  if patient tolerates    - Tylenol IVPB x 1 stat  - will start on IV fluid - pt received 1.5 L bolus in the ED  - c/w Nebs  - Monitor vitals and pulse ox  - Will repeat lactate    # DVT ppx  - Pt si on Eliquis       Plan d/w RN     Case d/w Dr. Joy     Addendum:  S/p BiPAP sat is still in 80%, d/w RN to call respi. therapist to adjust setting to keep o2 sat atleast 90 %.   I Spoke to Pt's Son Francisco Campos HCP overnight around 23:40 and he confirmed over the phone that Pt is DNR/DNI.   C/w IVF Bolus and then maintenance  Repeat ABG and lactate check  Monitor vitals    Above d/w Dr. Joy

## 2019-10-05 NOTE — ED ADULT NURSE NOTE - OBJECTIVE STATEMENT
Pt BIBA from [facility] for fever and cough. Sepsis work up done. Pt alert and confused which is baseline.

## 2019-10-05 NOTE — ED PROVIDER NOTE - OBJECTIVE STATEMENT
89 y/o female with a PMHx of Dementia, CVA, DM, HLD, presents to the ED BIBEMS from [facility] c/o difficulty breathing. +cough. As per transfer note, pt is hypoxia, increase lethargy, and O2 of 74% on room air and increased to 88% on O2 and with a temp of 99.8F at facility. Pt presents with DNR. Full HPI is unobtainable due to pt not being alert and orieted, hx obtained by paperwork. 89 y/o female with a PMHx of Dementia, CVA, DM, HLD, [facility]. +cough. As per transfer note, pt is hypoxia, increase lethargy, and O2 of 74% on room air and increased to 88% on O2 and with a temp of 99.8F at facility. Pt presents with DNR. Full HPI is unobtainable due to pt not being alert and oriented, hx obtained by paperwork.

## 2019-10-05 NOTE — ED PROVIDER NOTE - CLINICAL SUMMARY MEDICAL DECISION MAKING FREE TEXT BOX
89 y/o female with cough, low O2 sat, EKG, labs and admit. 89 y/o female with cough, low O2 sat, EKG, cxr, labs and admit.

## 2019-10-05 NOTE — ED ADULT NURSE NOTE - NSIMPLEMENTINTERV_GEN_ALL_ED
Implemented All Fall with Harm Risk Interventions:  [place] to call system. Call bell, personal items and telephone within reach. Instruct patient to call for assistance. Room bathroom lighting operational. Non-slip footwear when patient is off stretcher. Physically safe environment: no spills, clutter or unnecessary equipment. Stretcher in lowest position, wheels locked, appropriate side rails in place. Provide visual cue, wrist band, yellow gown, etc. Monitor gait and stability. Monitor for mental status changes and reorient to person, place, and time. Review medications for side effects contributing to fall risk. Reinforce activity limits and safety measures with patient and family. Provide visual clues: red socks.

## 2019-10-05 NOTE — ED ADULT TRIAGE NOTE - CHIEF COMPLAINT QUOTE
pt BIBEMS from [facility] for eval of hypoxia, 78% on RA. pt arrives to ED on NRB 94%. pt w/ dementia, hx CVA. lowgrade temp at NH 99.8. at triage unable to obtain oral temp, will perform rectal in room. transfer paperwork lists pt as DNR but no MOLST transferred w/ pt.

## 2019-10-05 NOTE — PATIENT PROFILE ADULT - FUNCTIONAL SCREEN CURRENT LEVEL: COMMUNICATION, MLM
3 = unable to speak (not related to language barrier) 3 = unable to understand or speak (not related to language barrier)

## 2019-10-05 NOTE — ED ADULT NURSE REASSESSMENT NOTE - NS ED NURSE REASSESS COMMENT FT1
Patient confused, alert but nonverbal. No s/s of distress present. Hygiene care performed, pt repositioned for comfort & safety. Hand-off report to RN Yeny, transport in progress to . Safety & comfort measures in place, purposeful active rounding completed.

## 2019-10-05 NOTE — H&P ADULT - ASSESSMENT
a 89 yo female with Hx. of Dementia, CVA, DM, HLD, DVT who was sent to the ED from [facility] for severe dyspnea, hypoxia, lethargy, O2 Sat 74%. The patient was diagnosed with LLL pneumonia on CT and referred for inpatient IV AB.  assessment Dx:                       1.LLL Pneumonia r/o sepsis                       2.DVT on Eliquis                       3. DM2                       4.s/p CVA                       5. Demenita     Plan:    admit to medicine               medications: starte on Aztreonam and Vancomycin in ED, continue current meds as per med rec.                VTEP: already on Eliquis               Labs: cbc,bmp                Radiology:CT chest                Cardiac diagnostics: EKG                Consults: ID for IV Ab management.                Advance care planning: the patient has a non Hospital DNR. She is not capable to make DNR decisions.

## 2019-10-06 LAB
ANION GAP SERPL CALC-SCNC: 9 MMOL/L — SIGNIFICANT CHANGE UP (ref 5–17)
BASE EXCESS BLDA CALC-SCNC: -3.7 MMOL/L — LOW (ref -2–2)
BLOOD GAS COMMENTS ARTERIAL: SIGNIFICANT CHANGE UP
BLOOD GAS COMMENTS ARTERIAL: SIGNIFICANT CHANGE UP
BUN SERPL-MCNC: 62 MG/DL — HIGH (ref 7–23)
CALCIUM SERPL-MCNC: 8.3 MG/DL — LOW (ref 8.5–10.1)
CHLORIDE SERPL-SCNC: 114 MMOL/L — HIGH (ref 96–108)
CO2 SERPL-SCNC: 22 MMOL/L — SIGNIFICANT CHANGE UP (ref 22–31)
CREAT SERPL-MCNC: 1.41 MG/DL — HIGH (ref 0.5–1.3)
GAS PNL BLDA: SIGNIFICANT CHANGE UP
GLUCOSE SERPL-MCNC: 86 MG/DL — SIGNIFICANT CHANGE UP (ref 70–99)
GRAM STN FLD: SIGNIFICANT CHANGE UP
HCO3 BLDA-SCNC: 20 MMOL/L — LOW (ref 21–29)
METHOD TYPE: SIGNIFICANT CHANGE UP
PCO2 BLDA: 33 MMHG — SIGNIFICANT CHANGE UP (ref 32–46)
PH BLDA: 7.4 — SIGNIFICANT CHANGE UP (ref 7.35–7.45)
PO2 BLDA: 79 MMHG — SIGNIFICANT CHANGE UP (ref 74–108)
POTASSIUM SERPL-MCNC: 4.4 MMOL/L — SIGNIFICANT CHANGE UP (ref 3.5–5.3)
POTASSIUM SERPL-SCNC: 4.4 MMOL/L — SIGNIFICANT CHANGE UP (ref 3.5–5.3)
S PNEUM DNA BLD POS QL NAA+NON-PROBE: SIGNIFICANT CHANGE UP
SAO2 % BLDA: 95 % — SIGNIFICANT CHANGE UP (ref 92–96)
SODIUM SERPL-SCNC: 145 MMOL/L — SIGNIFICANT CHANGE UP (ref 135–145)

## 2019-10-06 RX ORDER — FUROSEMIDE 40 MG
40 TABLET ORAL DAILY
Refills: 0 | Status: DISCONTINUED | OUTPATIENT
Start: 2019-10-06 | End: 2019-10-08

## 2019-10-06 RX ORDER — AZTREONAM 2 G
1000 VIAL (EA) INJECTION ONCE
Refills: 0 | Status: COMPLETED | OUTPATIENT
Start: 2019-10-06 | End: 2019-10-06

## 2019-10-06 RX ORDER — CEFTRIAXONE 500 MG/1
2000 INJECTION, POWDER, FOR SOLUTION INTRAMUSCULAR; INTRAVENOUS EVERY 24 HOURS
Refills: 0 | Status: DISCONTINUED | OUTPATIENT
Start: 2019-10-06 | End: 2019-10-07

## 2019-10-06 RX ORDER — SODIUM CHLORIDE 9 MG/ML
1000 INJECTION INTRAMUSCULAR; INTRAVENOUS; SUBCUTANEOUS
Refills: 0 | Status: DISCONTINUED | OUTPATIENT
Start: 2019-10-06 | End: 2019-10-07

## 2019-10-06 RX ORDER — AZTREONAM 2 G
1000 VIAL (EA) INJECTION EVERY 8 HOURS
Refills: 0 | Status: DISCONTINUED | OUTPATIENT
Start: 2019-10-06 | End: 2019-10-06

## 2019-10-06 RX ORDER — ROBINUL 0.2 MG/ML
0.2 INJECTION INTRAMUSCULAR; INTRAVENOUS EVERY 6 HOURS
Refills: 0 | Status: DISCONTINUED | OUTPATIENT
Start: 2019-10-06 | End: 2019-10-08

## 2019-10-06 RX ORDER — MORPHINE SULFATE 50 MG/1
2 CAPSULE, EXTENDED RELEASE ORAL EVERY 6 HOURS
Refills: 0 | Status: DISCONTINUED | OUTPATIENT
Start: 2019-10-06 | End: 2019-10-07

## 2019-10-06 RX ORDER — AZTREONAM 2 G
VIAL (EA) INJECTION
Refills: 0 | Status: DISCONTINUED | OUTPATIENT
Start: 2019-10-06 | End: 2019-10-06

## 2019-10-06 RX ORDER — VANCOMYCIN HCL 1 G
500 VIAL (EA) INTRAVENOUS EVERY 12 HOURS
Refills: 0 | Status: DISCONTINUED | OUTPATIENT
Start: 2019-10-06 | End: 2019-10-07

## 2019-10-06 RX ADMIN — Medication 40 MILLIGRAM(S): at 12:54

## 2019-10-06 RX ADMIN — MORPHINE SULFATE 2 MILLIGRAM(S): 50 CAPSULE, EXTENDED RELEASE ORAL at 19:21

## 2019-10-06 RX ADMIN — CEFTRIAXONE 2000 MILLIGRAM(S): 500 INJECTION, POWDER, FOR SOLUTION INTRAMUSCULAR; INTRAVENOUS at 12:20

## 2019-10-06 RX ADMIN — Medication 3 MILLILITER(S): at 19:53

## 2019-10-06 RX ADMIN — ROBINUL 0.2 MILLIGRAM(S): 0.2 INJECTION INTRAMUSCULAR; INTRAVENOUS at 17:20

## 2019-10-06 RX ADMIN — MORPHINE SULFATE 2 MILLIGRAM(S): 50 CAPSULE, EXTENDED RELEASE ORAL at 20:00

## 2019-10-06 RX ADMIN — SODIUM CHLORIDE 1000 MILLILITER(S): 9 INJECTION INTRAMUSCULAR; INTRAVENOUS; SUBCUTANEOUS at 00:23

## 2019-10-06 RX ADMIN — Medication 3 MILLILITER(S): at 00:43

## 2019-10-06 RX ADMIN — Medication 50 MILLIGRAM(S): at 10:26

## 2019-10-06 RX ADMIN — LATANOPROST 1 DROP(S): 0.05 SOLUTION/ DROPS OPHTHALMIC; TOPICAL at 21:37

## 2019-10-06 RX ADMIN — Medication 3 MILLILITER(S): at 08:26

## 2019-10-06 RX ADMIN — Medication 0.5 MILLIGRAM(S): at 17:28

## 2019-10-06 RX ADMIN — Medication 3 MILLILITER(S): at 13:30

## 2019-10-06 RX ADMIN — MORPHINE SULFATE 2 MILLIGRAM(S): 50 CAPSULE, EXTENDED RELEASE ORAL at 13:12

## 2019-10-06 RX ADMIN — SODIUM CHLORIDE 75 MILLILITER(S): 9 INJECTION INTRAMUSCULAR; INTRAVENOUS; SUBCUTANEOUS at 10:10

## 2019-10-06 RX ADMIN — Medication 3 MILLILITER(S): at 01:45

## 2019-10-06 RX ADMIN — MORPHINE SULFATE 2 MILLIGRAM(S): 50 CAPSULE, EXTENDED RELEASE ORAL at 13:42

## 2019-10-06 RX ADMIN — Medication 100 MILLIGRAM(S): at 17:20

## 2019-10-06 NOTE — DIETITIAN INITIAL EVALUATION ADULT. - NAME AND PHONE
Aminah Montes De Oca MA, RDN, CDN, [facility]  [phone] (office number)  [phone] (pager number)

## 2019-10-06 NOTE — DIETITIAN INITIAL EVALUATION ADULT. - FACTORS AFF FOOD INTAKE
unable to obtain information from patient 2/2 dementia and SOB.  no family at bedside and unable to reach family./change in mental status

## 2019-10-06 NOTE — PROGRESS NOTE ADULT - SUBJECTIVE AND OBJECTIVE BOX
Patient is a 89y old  Female who presents with a chief complaint of Pneumonia       SUBJECTIVE:   HPI:  HPI:The patient is a 89 yo female with Hx. of Dementia, CVA, DM, HLD, DVT who was sent to the ED from [facility] for severe dyspnea, hypoxia, lethargy, O2 Sat 74%. The patient was diagnosed with LLL pneumonia on CT and referred for inpatient IV AB.    PT cannot provide any information. Is on bipap, tachypneic and appears extremely weak. Spoke to HCP Francisco via phone, was made aware mother's condition is critical. I discussed option of comfort management Will try abx and cont bipap, adding morphine, ativan, lasix and robinul. IF no improvement and appears worse or unchanged in next 24 hours, he would like to consider pure comfort management MOLST signed and in chart. TT discussing GOC : 25 min.       PMHx:  Dementia, CVA, DM, HLD, DVT    PSHx: N/A     Family Hx: not obtainable secondary to dementia.    Social Hx.: not smoking, no alcohol use    ROS: not obtainable secondary to dementia.        ICU Vital Signs Last 24 Hrs  T(C): 37.8 (06 Oct 2019 09:00), Max: 38.4 (05 Oct 2019 21:24)  T(F): 100 (06 Oct 2019 09:00), Max: 101.2 (05 Oct 2019 21:24)  HR: 107 (06 Oct 2019 13:30) (78 - 121)  BP: 142/68 (06 Oct 2019 12:00) (90/47 - 150/64)  BP(mean): 86 (06 Oct 2019 12:00) (54 - 86)  ABP: --  ABP(mean): --  RR: 38 (06 Oct 2019 12:00) (18 - 38)  SpO2: 90% (06 Oct 2019 13:30) (81% - 96%)        HEENT: PRRL EOMI    MOUTH/TEETH/GUMS: Clear    NECK: no JVD    LUNGS: decreased BS at bases, rhonchi, rales at bases    HEART: S1,S2 RR    ABDOMEN: soft nontender    EXTREMITIES:  no pedal edema    MUSCULOSKELETAL: arthritic changes.     NEURO: no tremor, no focal signs.    SKIN: no rash    : CVA negative,     Lab:                       11.6   7.75  )-----------( 353      ( 05 Oct 2019 13:47 )             36.1       10-05    142  |  108  |  62<H>  ----------------------------<  181<H>  4.5   |  24  |  1.55<H>    Ca    8.9      05 Oct 2019 13:47    TPro  6.1  /  Alb  1.6<L>  /  TBili  0.3  /  DBili  x   /  AST  43<H>  /  ALT  29  /  AlkPhos  79  10-05    UA 11-25 wbc,  RVP neg,     CT Chest : Extensive consolidation of the left lung with more mild   consolidation at the right lung base. This could represent extensive   pneumonia. A more aggressive process such as underlying abscess or   perhaps necrosis would be difficult to exclude on the left given the lack   of IV contrast. (05 Oct 2019 17:30)            LABS: All Labs Reviewed:        Blood Culture: 10-05 @ 13:47  Organism Blood Culture PCR  Gram Stain Blood -- Gram Stain   Growth in anaerobic bottle: Gram Positive Cocci in Pairs and Chains  Growth in aerobic bottle: Gram Positive Cocci in Pairs and Chains  Specimen Source .Blood None  Culture-Blood --        RADIOLOGY/EKG:  < from: CT Chest No Cont (10.05.19 @ 14:52) >    Impression: Extensive consolidation of the left lung with more mild   consolidation at the right lung base. This could represent extensive   pneumonia. A more aggressive process such as underlying abscess or   perhaps necrosis would be difficult to exclude on the left given the lack   of IV contrast.    < end of copied text >

## 2019-10-06 NOTE — CONSULT NOTE ADULT - SUBJECTIVE AND OBJECTIVE BOX
Patient is a 89y old  Female who presents with a chief complaint of Pneumonia (05 Oct 2019 21:25)    HPI:  89 y/o female with h/o dementia, old CVA, DM, HLD, DVT was admitted on 10/5 from [facility] for worsening dyspnea, hypoxia, lethargy, O2 Sat 74%. The patient was diagnosed with LLL pneumonia on CT and referred for inpatient IV AB. In ER she was noted with low grade fever and received aztreonam and vancomycin IV.         CT Chest : Extensive consolidation of the left lung with more mild   consolidation at the right lung base. This could represent extensive   pneumonia. A more aggressive process such as underlying abscess or   perhaps necrosis would be difficult to exclude on the left given the lack   of IV contrast. (05 Oct 2019 17:30)      PMH: as above  PSH: as above  Meds: per reconciliation sheet, noted below  MEDICATIONS  (STANDING):  ALBUTerol    90 MICROgram(s) HFA Inhaler 1 Puff(s) Inhalation every 4 hours  ALBUTerol/ipratropium for Nebulization 3 milliLiter(s) Nebulizer every 6 hours  ALBUTerol/ipratropium for Nebulization. 3 milliLiter(s) Nebulizer once  ALBUTerol/ipratropium for Nebulization. 3 milliLiter(s) Nebulizer once  apixaban 5 milliGRAM(s) Oral two times a day  aspirin enteric coated 81 milliGRAM(s) Oral daily  ATENolol  Tablet 25 milliGRAM(s) Oral daily  aztreonam  IVPB 1000 milliGRAM(s) IV Intermittent every 8 hours  aztreonam  IVPB      latanoprost 0.005% Ophthalmic Solution 1 Drop(s) Both EYES at bedtime  sertraline 50 milliGRAM(s) Oral daily  sodium chloride 0.9%. 1000 milliLiter(s) (75 mL/Hr) IV Continuous <Continuous>    MEDICATIONS  (PRN):  acetaminophen   Tablet .. 650 milliGRAM(s) Oral every 6 hours PRN Temp greater or equal to 38C (100.4F), Mild Pain (1 - 3)  traMADol 50 milliGRAM(s) Oral daily PRN Moderate Pain (4 - 6)    Allergies    penicillins (Unknown)    Intolerances      Social: no smoking, no alcohol, no illegal drugs; no recent travel, no exposure to TB  FAMILY HISTORY:    no history of premature cardiovascular disease in first degree relatives    ROS: the patient denies fever, no chills, no HA, no seizures, no dizziness, no sore throat, no nasal congestion, no blurry vision, no CP, no palpitations, no SOB, no cough, no abdominal pain, no diarrhea, no N/V, no dysuria, no leg pain, no claudication, no rash, no joint aches, no rectal pain or bleeding, no night sweats  All other systems reviewed and are negative    Vital Signs Last 24 Hrs  T(C): 37.8 (06 Oct 2019 09:00), Max: 38.4 (05 Oct 2019 21:24)  T(F): 100 (06 Oct 2019 09:00), Max: 101.2 (05 Oct 2019 21:24)  HR: 104 (06 Oct 2019 10:00) (78 - 104)  BP: 136/73 (06 Oct 2019 10:00) (90/47 - 150/64)  BP(mean): 86 (06 Oct 2019 10:00) (54 - 86)  RR: 35 (06 Oct 2019 10:00) (18 - 35)  SpO2: 92% (06 Oct 2019 10:00) (81% - 96%)  Daily Height in cm: 167.64 (05 Oct 2019 12:19)    Daily Weight in k.9 (06 Oct 2019 09:44)    PE:    Constitutional: frail looking  HEENT: NC/AT, EOMI, PERRLA, conjunctivae clear; ears and nose atraumatic; pharynx benign  Neck: supple; thyroid not palpable  Back: no tenderness  Respiratory: respiratory effort normal; clear to auscultation  Cardiovascular: S1S2 regular, no murmurs  Abdomen: soft, not tender, not distended, positive BS; no liver or spleen organomegaly  Genitourinary: no suprapubic tenderness  Lymphatic: no LN palpable  Musculoskeletal: no muscle tenderness, no joint swelling or tenderness  Extremities: no pedal edema  Neurological/ Psychiatric: confused, judgement and insight impaired; moving all extremities  Skin: no rashes; no palpable lesions    Labs: all available labs reviewed                        .1   90  )-----------( 291      ( 05 Oct 2019 19:35 )             34.9     10-06    145  |  114<H>  |  62<H>  ----------------------------<  86  4.4   |  22  |  1.41<H>    Ca    8.3<L>      06 Oct 2019 06:18    TPro  6.1  /  Alb  1.6<L>  /  TBili  0.3  /  DBili  x   /  AST  43<H>  /  ALT  29  /  AlkPhos  79  10-05     LIVER FUNCTIONS - ( 05 Oct 2019 13:47 )  Alb: 1.6 g/dL / Pro: 6.1 gm/dL / ALK PHOS: 79 U/L / ALT: 29 U/L / AST: 43 U/L / GGT: x           Urinalysis Basic - ( 05 Oct 2019 13:47 )    Color: Yellow / Appearance: Slightly Turbid / S.005 / pH: x  Gluc: x / Ketone: Negative  / Bili: Negative / Urobili: Negative mg/dL   Blood: x / Protein: 100 mg/dL / Nitrite: Negative   Leuk Esterase: Moderate / RBC: 3-5 /HPF / WBC 11-25   Sq Epi: x / Non Sq Epi: Few / Bacteria: Many      Culture - Blood (collected 05 Oct 2019 13:47)  Source: .Blood None  Gram Stain (06 Oct 2019 00:51):    Growth in anaerobic bottle: Gram Positive Cocci in Pairs and Chains    Growth in aerobic bottle: Gram Positive Cocci in Pairs and Chains  Preliminary Report (06 Oct 2019 00:52):    Growth in anaerobic bottle: Gram Positive Cocci in Pairs and Chains    Growth in aerobic bottle: Gram Positive Cocci in Pairs and Chains  Organism: Blood Culture PCR (06 Oct 2019 00:53)      -  Streptococcus pneumoniae: Detec      Method Type: PCR    Radiology: all available radiological tests reviewed    < from: CT Chest No Cont (10.05.19 @ 14:52) >  Extensive consolidation of the left lung with more mild   consolidation at the right lung base. This could represent extensive   pneumonia. A more aggressive process such as underlying abscess or   perhaps necrosis would be difficult to exclude on the left given the lack   of IV contrast.    < end of copied text >      Advanced directives addressed: full resuscitation Patient is a 89y old  Female who presents with a chief complaint of Pneumonia (05 Oct 2019 21:25)    HPI:  89 y/o female with h/o dementia, old CVA, DM, HLD, DVT was admitted on 10/5 from [facility] for worsening dyspnea, hypoxia, lethargy, O2 Sat 74%. The patient was diagnosed with LLL pneumonia on CT and referred for inpatient IV AB. In ER she was noted with low grade fever and received aztreonam and vancomycin IV.         CT Chest : Extensive consolidation of the left lung with more mild   consolidation at the right lung base. This could represent extensive   pneumonia. A more aggressive process such as underlying abscess or   perhaps necrosis would be difficult to exclude on the left given the lack   of IV contrast. (05 Oct 2019 17:30)      PMH: as above  PSH: as above  Meds: per reconciliation sheet, noted below  MEDICATIONS  (STANDING):  ALBUTerol    90 MICROgram(s) HFA Inhaler 1 Puff(s) Inhalation every 4 hours  ALBUTerol/ipratropium for Nebulization 3 milliLiter(s) Nebulizer every 6 hours  ALBUTerol/ipratropium for Nebulization. 3 milliLiter(s) Nebulizer once  ALBUTerol/ipratropium for Nebulization. 3 milliLiter(s) Nebulizer once  apixaban 5 milliGRAM(s) Oral two times a day  aspirin enteric coated 81 milliGRAM(s) Oral daily  ATENolol  Tablet 25 milliGRAM(s) Oral daily  aztreonam  IVPB 1000 milliGRAM(s) IV Intermittent every 8 hours  aztreonam  IVPB      latanoprost 0.005% Ophthalmic Solution 1 Drop(s) Both EYES at bedtime  sertraline 50 milliGRAM(s) Oral daily  sodium chloride 0.9%. 1000 milliLiter(s) (75 mL/Hr) IV Continuous <Continuous>    MEDICATIONS  (PRN):  acetaminophen   Tablet .. 650 milliGRAM(s) Oral every 6 hours PRN Temp greater or equal to 38C (100.4F), Mild Pain (1 - 3)  traMADol 50 milliGRAM(s) Oral daily PRN Moderate Pain (4 - 6)    Allergies    penicillins (Unknown); allergy reaction occurred 50 years ago; son dose not remember what kind of reaction    Intolerances      Social: no smoking, no alcohol, no illegal drugs; no recent travel, no exposure to TB  FAMILY HISTORY:    no history of premature cardiovascular disease in first degree relatives    ROS: the patient is confused; unobtainable    Vital Signs Last 24 Hrs  T(C): 37.8 (06 Oct 2019 09:00), Max: 38.4 (05 Oct 2019 21:24)  T(F): 100 (06 Oct 2019 09:00), Max: 101.2 (05 Oct 2019 21:24)  HR: 104 (06 Oct 2019 10:00) (78 - 104)  BP: 136/73 (06 Oct 2019 10:00) (90/47 - 150/64)  BP(mean): 86 (06 Oct 2019 10:00) (54 - 86)  RR: 35 (06 Oct 2019 10:00) (18 - 35)  SpO2: 92% (06 Oct 2019 10:00) (81% - 96%)  Daily Height in cm: 167.64 (05 Oct 2019 12:19)    Daily Weight in k.9 (06 Oct 2019 09:44)    PE:    Constitutional: frail looking  HEENT: NC/AT, EOMI, PERRLA, conjunctivae clear  Neck: supple; thyroid not palpable  Back: no tenderness  Respiratory: respiratory effort normal; b/l rhonchi  Cardiovascular: S1S2 regular, no murmurs  Abdomen: soft, not tender, not distended, positive BS; no liver or spleen organomegaly  Genitourinary: no suprapubic tenderness  Lymphatic: no LN palpable  Musculoskeletal: no muscle tenderness, no joint swelling or tenderness  Extremities: no pedal edema  Neurological/ Psychiatric: confused, judgement and insight impaired; moving all extremities  Skin: no rashes; no palpable lesions    Labs: all available labs reviewed                        11.1   890  )-----------( 291      ( 05 Oct 2019 19:35 )             34.9     10-06    145  |  114<H>  |  62<H>  ----------------------------<  86  4.4   |  22  |  1.41<H>    Ca    8.3<L>      06 Oct 2019 06:18    TPro  6.1  /  Alb  1.6<L>  /  TBili  0.3  /  DBili  x   /  AST  43<H>  /  ALT  29  /  AlkPhos  79  10-05     LIVER FUNCTIONS - ( 05 Oct 2019 13:47 )  Alb: 1.6 g/dL / Pro: 6.1 gm/dL / ALK PHOS: 79 U/L / ALT: 29 U/L / AST: 43 U/L / GGT: x           Urinalysis Basic - ( 05 Oct 2019 13:47 )    Color: Yellow / Appearance: Slightly Turbid / S.005 / pH: x  Gluc: x / Ketone: Negative  / Bili: Negative / Urobili: Negative mg/dL   Blood: x / Protein: 100 mg/dL / Nitrite: Negative   Leuk Esterase: Moderate / RBC: 3-5 /HPF / WBC 11-25   Sq Epi: x / Non Sq Epi: Few / Bacteria: Many      Culture - Blood (collected 05 Oct 2019 13:47)  Source: .Blood None  Gram Stain (06 Oct 2019 00:51):    Growth in anaerobic bottle: Gram Positive Cocci in Pairs and Chains    Growth in aerobic bottle: Gram Positive Cocci in Pairs and Chains  Preliminary Report (06 Oct 2019 00:52):    Growth in anaerobic bottle: Gram Positive Cocci in Pairs and Chains    Growth in aerobic bottle: Gram Positive Cocci in Pairs and Chains  Organism: Blood Culture PCR (06 Oct 2019 00:53)      -  Streptococcus pneumoniae: Detec      Method Type: PCR    Radiology: all available radiological tests reviewed    < from: CT Chest No Cont (10.05.19 @ 14:52) >  Extensive consolidation of the left lung with more mild   consolidation at the right lung base. This could represent extensive   pneumonia. A more aggressive process such as underlying abscess or   perhaps necrosis would be difficult to exclude on the left given the lack   of IV contrast.    < end of copied text >      Advanced directives addressed: full resuscitation

## 2019-10-06 NOTE — DIETITIAN INITIAL EVALUATION ADULT. - PERTINENT LABORATORY DATA
10-06 Na145 mmol/L Glu 86 mg/dL K+ 4.4 mmol/L Cr  1.41 mg/dL<H> BUN 62 mg/dL<H> Phos n/a   Alb n/a   PAB n/a

## 2019-10-06 NOTE — DIETITIAN INITIAL EVALUATION ADULT. - ADD RECOMMEND
1) add glucerna TID with gelatin BID to optimize PO intake when pt is safe for PO intake 2) add MVI with minerals daily, vitamin C 500mg BID to optimize wound healing 3) daily wt checks 4) monitor NPO length, advancement/tolerance nutr source

## 2019-10-06 NOTE — PROVIDER CONTACT NOTE (CHANGE IN STATUS NOTIFICATION) - SITUATION
spoke to SHAWNEE Thomas to confirm anticoagulation treatment - PA advised to reschedule PO Eliquis to day shift to assess if the pt is able to come off bipap. SHAWNEE Thomas also made aware of unregulated lactate level. PA advised for pt to remain off fluids at this time.

## 2019-10-06 NOTE — DIETITIAN INITIAL EVALUATION ADULT. - PERTINENT MEDS FT
MEDICATIONS  (STANDING):  ALBUTerol    90 MICROgram(s) HFA Inhaler 1 Puff(s) Inhalation every 4 hours  ALBUTerol/ipratropium for Nebulization 3 milliLiter(s) Nebulizer every 6 hours  ALBUTerol/ipratropium for Nebulization. 3 milliLiter(s) Nebulizer once  ALBUTerol/ipratropium for Nebulization. 3 milliLiter(s) Nebulizer once  apixaban 5 milliGRAM(s) Oral two times a day  aspirin enteric coated 81 milliGRAM(s) Oral daily  ATENolol  Tablet 25 milliGRAM(s) Oral daily  aztreonam  IVPB 1000 milliGRAM(s) IV Intermittent every 8 hours  aztreonam  IVPB      latanoprost 0.005% Ophthalmic Solution 1 Drop(s) Both EYES at bedtime  sertraline 50 milliGRAM(s) Oral daily  sodium chloride 0.9%. 1000 milliLiter(s) (75 mL/Hr) IV Continuous <Continuous>    MEDICATIONS  (PRN):  acetaminophen   Tablet .. 650 milliGRAM(s) Oral every 6 hours PRN Temp greater or equal to 38C (100.4F), Mild Pain (1 - 3)  traMADol 50 milliGRAM(s) Oral daily PRN Moderate Pain (4 - 6)
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
## 2019-10-06 NOTE — DIETITIAN INITIAL EVALUATION ADULT. - OTHER INFO
89yo female p/w LLL PNA on CT; referred to hospital for IV Abx.  Pt on continuous bipap, currently not stable enough to be off.  No BM noted since admission.  Pt groaning when attempted to perform NFPE.  NFPE deferred at this time.  Re-checked wt with bedscale; new wt of 45.5Kg (100#); which is close to NH reported wt.  Pt is possibly malnourished, however, given limited information unable to prove criteria at this time.  will f/u to re-evaluate.

## 2019-10-06 NOTE — DIETITIAN INITIAL EVALUATION ADULT. - PHYSICAL APPEARANCE
other (specify)/overweight mild Rt/Lt ankle/foot edema.  yashira score of 9; stage 1 PU on coccyx and b/l heels.

## 2019-10-07 ENCOUNTER — TRANSCRIPTION ENCOUNTER (OUTPATIENT)
Age: 84
End: 2019-10-07

## 2019-10-07 LAB
-  AMIKACIN: SIGNIFICANT CHANGE UP
-  AMPICILLIN/SULBACTAM: SIGNIFICANT CHANGE UP
-  AMPICILLIN: SIGNIFICANT CHANGE UP
-  AZTREONAM: SIGNIFICANT CHANGE UP
-  CEFAZOLIN: SIGNIFICANT CHANGE UP
-  CEFEPIME: SIGNIFICANT CHANGE UP
-  CEFOXITIN: SIGNIFICANT CHANGE UP
-  CEFTRIAXONE: SIGNIFICANT CHANGE UP
-  CEFTRIAXONE: SIGNIFICANT CHANGE UP
-  CIPROFLOXACIN: SIGNIFICANT CHANGE UP
-  GENTAMICIN: SIGNIFICANT CHANGE UP
-  LEVOFLOXACIN: SIGNIFICANT CHANGE UP
-  MEROPENEM: SIGNIFICANT CHANGE UP
-  NITROFURANTOIN: SIGNIFICANT CHANGE UP
-  PENICILLIN: SIGNIFICANT CHANGE UP
-  PIPERACILLIN/TAZOBACTAM: SIGNIFICANT CHANGE UP
-  TOBRAMYCIN: SIGNIFICANT CHANGE UP
-  TRIMETHOPRIM/SULFAMETHOXAZOLE: SIGNIFICANT CHANGE UP
ANION GAP SERPL CALC-SCNC: 16 MMOL/L — SIGNIFICANT CHANGE UP (ref 5–17)
BUN SERPL-MCNC: 77 MG/DL — HIGH (ref 7–23)
CALCIUM SERPL-MCNC: 8.5 MG/DL — SIGNIFICANT CHANGE UP (ref 8.5–10.1)
CHLORIDE SERPL-SCNC: 117 MMOL/L — HIGH (ref 96–108)
CO2 SERPL-SCNC: 16 MMOL/L — LOW (ref 22–31)
CREAT SERPL-MCNC: 1.69 MG/DL — HIGH (ref 0.5–1.3)
CULTURE RESULTS: SIGNIFICANT CHANGE UP
CULTURE RESULTS: SIGNIFICANT CHANGE UP
GLUCOSE SERPL-MCNC: 99 MG/DL — SIGNIFICANT CHANGE UP (ref 70–99)
HCT VFR BLD CALC: 35 % — SIGNIFICANT CHANGE UP (ref 34.5–45)
HGB BLD-MCNC: 10.5 G/DL — LOW (ref 11.5–15.5)
MCHC RBC-ENTMCNC: 30 GM/DL — LOW (ref 32–36)
MCHC RBC-ENTMCNC: 30.4 PG — SIGNIFICANT CHANGE UP (ref 27–34)
MCV RBC AUTO: 101.4 FL — HIGH (ref 80–100)
METHOD TYPE: SIGNIFICANT CHANGE UP
ORGANISM # SPEC MICROSCOPIC CNT: SIGNIFICANT CHANGE UP
PLATELET # BLD AUTO: 161 K/UL — SIGNIFICANT CHANGE UP (ref 150–400)
POTASSIUM SERPL-MCNC: 4.1 MMOL/L — SIGNIFICANT CHANGE UP (ref 3.5–5.3)
POTASSIUM SERPL-SCNC: 4.1 MMOL/L — SIGNIFICANT CHANGE UP (ref 3.5–5.3)
RBC # BLD: 3.45 M/UL — LOW (ref 3.8–5.2)
RBC # FLD: 15.2 % — HIGH (ref 10.3–14.5)
SODIUM SERPL-SCNC: 149 MMOL/L — HIGH (ref 135–145)
SPECIMEN SOURCE: SIGNIFICANT CHANGE UP
SPECIMEN SOURCE: SIGNIFICANT CHANGE UP
VANCOMYCIN TROUGH SERPL-MCNC: 32.9 UG/ML — CRITICAL HIGH (ref 10–20)
WBC # BLD: 24.62 K/UL — HIGH (ref 3.8–10.5)
WBC # FLD AUTO: 24.62 K/UL — HIGH (ref 3.8–10.5)

## 2019-10-07 PROCEDURE — 93306 TTE W/DOPPLER COMPLETE: CPT | Mod: 26

## 2019-10-07 RX ORDER — LATANOPROST 0.05 MG/ML
1 SOLUTION/ DROPS OPHTHALMIC; TOPICAL
Qty: 0 | Refills: 0 | DISCHARGE

## 2019-10-07 RX ORDER — ALBUTEROL 90 UG/1
1 AEROSOL, METERED ORAL
Qty: 0 | Refills: 0 | DISCHARGE
Start: 2019-10-07

## 2019-10-07 RX ORDER — ROBINUL 0.2 MG/ML
0 INJECTION INTRAMUSCULAR; INTRAVENOUS
Qty: 0 | Refills: 0 | DISCHARGE
Start: 2019-10-07

## 2019-10-07 RX ORDER — TRAMADOL HYDROCHLORIDE 50 MG/1
1 TABLET ORAL
Qty: 0 | Refills: 0 | DISCHARGE

## 2019-10-07 RX ORDER — APIXABAN 2.5 MG/1
1 TABLET, FILM COATED ORAL
Qty: 0 | Refills: 0 | DISCHARGE

## 2019-10-07 RX ORDER — MORPHINE SULFATE 50 MG/1
4 CAPSULE, EXTENDED RELEASE ORAL
Refills: 0 | Status: DISCONTINUED | OUTPATIENT
Start: 2019-10-07 | End: 2019-10-08

## 2019-10-07 RX ORDER — ATENOLOL 25 MG/1
1 TABLET ORAL
Qty: 0 | Refills: 0 | DISCHARGE

## 2019-10-07 RX ORDER — MORPHINE SULFATE 50 MG/1
1 CAPSULE, EXTENDED RELEASE ORAL
Qty: 0 | Refills: 0 | DISCHARGE
Start: 2019-10-07

## 2019-10-07 RX ORDER — SERTRALINE 25 MG/1
1 TABLET, FILM COATED ORAL
Qty: 0 | Refills: 0 | DISCHARGE

## 2019-10-07 RX ORDER — ASPIRIN/CALCIUM CARB/MAGNESIUM 324 MG
1 TABLET ORAL
Qty: 0 | Refills: 0 | DISCHARGE

## 2019-10-07 RX ORDER — MORPHINE SULFATE 50 MG/1
2 CAPSULE, EXTENDED RELEASE ORAL
Refills: 0 | Status: DISCONTINUED | OUTPATIENT
Start: 2019-10-07 | End: 2019-10-07

## 2019-10-07 RX ORDER — MORPHINE SULFATE 50 MG/1
2 CAPSULE, EXTENDED RELEASE ORAL
Refills: 0 | Status: DISCONTINUED | OUTPATIENT
Start: 2019-10-07 | End: 2019-10-08

## 2019-10-07 RX ORDER — FUROSEMIDE 40 MG
40 TABLET ORAL
Qty: 0 | Refills: 0 | DISCHARGE
Start: 2019-10-07

## 2019-10-07 RX ADMIN — ROBINUL 0.2 MILLIGRAM(S): 0.2 INJECTION INTRAMUSCULAR; INTRAVENOUS at 00:02

## 2019-10-07 RX ADMIN — MORPHINE SULFATE 2 MILLIGRAM(S): 50 CAPSULE, EXTENDED RELEASE ORAL at 20:30

## 2019-10-07 RX ADMIN — Medication 3 MILLILITER(S): at 01:57

## 2019-10-07 RX ADMIN — MORPHINE SULFATE 2 MILLIGRAM(S): 50 CAPSULE, EXTENDED RELEASE ORAL at 18:00

## 2019-10-07 RX ADMIN — MORPHINE SULFATE 2 MILLIGRAM(S): 50 CAPSULE, EXTENDED RELEASE ORAL at 15:11

## 2019-10-07 RX ADMIN — MORPHINE SULFATE 2 MILLIGRAM(S): 50 CAPSULE, EXTENDED RELEASE ORAL at 15:41

## 2019-10-07 RX ADMIN — Medication 40 MILLIGRAM(S): at 00:57

## 2019-10-07 RX ADMIN — MORPHINE SULFATE 2 MILLIGRAM(S): 50 CAPSULE, EXTENDED RELEASE ORAL at 01:59

## 2019-10-07 RX ADMIN — MORPHINE SULFATE 2 MILLIGRAM(S): 50 CAPSULE, EXTENDED RELEASE ORAL at 13:35

## 2019-10-07 RX ADMIN — ROBINUL 0.2 MILLIGRAM(S): 0.2 INJECTION INTRAMUSCULAR; INTRAVENOUS at 05:24

## 2019-10-07 RX ADMIN — Medication 100 MILLIGRAM(S): at 05:23

## 2019-10-07 RX ADMIN — ROBINUL 0.2 MILLIGRAM(S): 0.2 INJECTION INTRAMUSCULAR; INTRAVENOUS at 11:32

## 2019-10-07 RX ADMIN — ROBINUL 0.2 MILLIGRAM(S): 0.2 INJECTION INTRAMUSCULAR; INTRAVENOUS at 17:30

## 2019-10-07 RX ADMIN — MORPHINE SULFATE 2 MILLIGRAM(S): 50 CAPSULE, EXTENDED RELEASE ORAL at 17:30

## 2019-10-07 RX ADMIN — MORPHINE SULFATE 2 MILLIGRAM(S): 50 CAPSULE, EXTENDED RELEASE ORAL at 20:15

## 2019-10-07 RX ADMIN — MORPHINE SULFATE 2 MILLIGRAM(S): 50 CAPSULE, EXTENDED RELEASE ORAL at 11:35

## 2019-10-07 RX ADMIN — MORPHINE SULFATE 2 MILLIGRAM(S): 50 CAPSULE, EXTENDED RELEASE ORAL at 02:39
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
## 2019-10-07 NOTE — PROGRESS NOTE ADULT - SUBJECTIVE AND OBJECTIVE BOX
Date of service: 10-07-19 @ 16:22    Lying in bed in NAD  Events noted  Lethargic  Has low grade fever    ROS: unobtainable    MEDICATIONS  (STANDING):  ALBUTerol    90 MICROgram(s) HFA Inhaler 1 Puff(s) Inhalation every 4 hours  ALBUTerol/ipratropium for Nebulization 3 milliLiter(s) Nebulizer every 6 hours  ALBUTerol/ipratropium for Nebulization. 3 milliLiter(s) Nebulizer once  ALBUTerol/ipratropium for Nebulization. 3 milliLiter(s) Nebulizer once  glycopyrrolate Injectable 0.2 milliGRAM(s) IV Push every 6 hours      Vital Signs Last 24 Hrs  T(C): 37.6 (07 Oct 2019 11:30), Max: 37.6 (07 Oct 2019 03:00)  T(F): 99.6 (07 Oct 2019 11:30), Max: 99.7 (07 Oct 2019 03:00)  HR: 102 (07 Oct 2019 10:00) (96 - 113)  BP: 140/65 (07 Oct 2019 10:00) (91/63 - 147/70)  BP(mean): 83 (07 Oct 2019 10:00) (59 - 95)  RR: 28 (07 Oct 2019 10:00) (22 - 32)  SpO2: 93% (07 Oct 2019 10:00) (87% - 95%)    Physical Exam:      Constitutional: frail looking  HEENT: NC/AT, EOMI, PERRLA, conjunctivae clear  Neck: supple; thyroid not palpable  Back: no tenderness  Respiratory: respiratory effort normal; b/l rhonchi  Cardiovascular: S1S2 regular, no murmurs  Abdomen: soft, not tender, not distended, positive BS  Genitourinary: no suprapubic tenderness  Lymphatic: no LN palpable  Musculoskeletal: no muscle tenderness, no joint swelling or tenderness  Extremities: no pedal edema  Neurological/ Psychiatric: confused, moving all extremities  Skin: no rashes; no palpable lesions    Labs: reviewed                        10..62 )-----------( 161      ( 07 Oct 2019 06:06 )             35.0     10    149<H>  |  117<H>  |  77<H>  ----------------------------<  99  4.1   |  16<L>  |  1.69<H>    Ca    8.5      07 Oct 2019 06:06    Vancomycin Level, Trough: 32.9 ug/mL (10-07 @ 06:06)                          11.1   8.90  )-----------( 291      ( 05 Oct 2019 19:35 )             34.9     10    145  |  114<H>  |  62<H>  ----------------------------<  86  4.4   |  22  |  1.41<H>    Ca    8.3<L>      06 Oct 2019 06:18    TPro  6.1  /  Alb  1.6<L>  /  TBili  0.3  /  DBili  x   /  AST  43<H>  /  ALT  29  /  AlkPhos  79  10-05     LIVER FUNCTIONS - ( 05 Oct 2019 13:47 )  Alb: 1.6 g/dL / Pro: 6.1 gm/dL / ALK PHOS: 79 U/L / ALT: 29 U/L / AST: 43 U/L / GGT: x           Urinalysis Basic - ( 05 Oct 2019 13:47 )    Color: Yellow / Appearance: Slightly Turbid / S.005 / pH: x  Gluc: x / Ketone: Negative  / Bili: Negative / Urobili: Negative mg/dL   Blood: x / Protein: 100 mg/dL / Nitrite: Negative   Leuk Esterase: Moderate / RBC: 3-5 /HPF / WBC 11-25   Sq Epi: x / Non Sq Epi: Few / Bacteria: Many      Culture - Blood (collected 05 Oct 2019 13:47)  Source: .Blood None  Gram Stain (06 Oct 2019 00:51):    Growth in anaerobic bottle: Gram Positive Cocci in Pairs and Chains    Growth in aerobic bottle: Gram Positive Cocci in Pairs and Chains  Preliminary Report (06 Oct 2019 00:52):    Growth in anaerobic bottle: Gram Positive Cocci in Pairs and Chains    Growth in aerobic bottle: Gram Positive Cocci in Pairs and Chains  Organism: Blood Culture PCR (06 Oct 2019 00:53)      -  Streptococcus pneumoniae: Detec      Method Type: PCR    Radiology: all available radiological tests reviewed    < from: CT Chest No Cont (10.05.19 @ 14:52) >  Extensive consolidation of the left lung with more mild   consolidation at the right lung base. This could represent extensive   pneumonia. A more aggressive process such as underlying abscess or   perhaps necrosis would be difficult to exclude on the left given the lack   of IV contrast.    < end of copied text >      Advanced directives addressed: full resuscitation

## 2019-10-07 NOTE — DISCHARGE NOTE PROVIDER - HOSPITAL COURSE
· Reason for Admission    Pneumonia            · Subjective and Objective:     Patient is a 89y old  Female who presents with a chief complaint of Pneumonia             SUBJECTIVE:     HPI:    HPI:The patient is a 89 yo female with Hx. of Dementia, CVA, DM, HLD, DVT who was sent to the ED from [facility] for severe dyspnea, hypoxia, lethargy, O2 Sat 74%. The patient was diagnosed with LLL pneumonia on CT and referred for inpatient IV AB.        PT cannot provide any information. Is on bipap, tachypneic and appears extremely weak. Spoke to HCP Francisco via phone, was made aware mother's condition is critical. I discussed option of comfort management Will try abx and cont bipap, adding morphine, ativan, lasix and robinul. IF no improvement and appears worse or unchanged in next 24 hours, he would like to consider pure comfort management [facility]OLST signed and in chart. TT discussing GOC : 25 min.         sub: patient obtunded with increased RR. WBC 24. Son agree to inpt hospice as patient continues to deteriorate clinically.         ICU Vital Signs Last 24 Hrs    T(C): 37.6 (07 Oct 2019 11:30), Max: 37.6 (07 Oct 2019 03:00)    T(F): 99.6 (07 Oct 2019 11:30), Max: 99.7 (07 Oct 2019 03:00)    HR: 102 (07 Oct 2019 10:00) (96 - 113)    BP: 140/65 (07 Oct 2019 10:00) (91/63 - 147/70)    BP(mean): 83 (07 Oct 2019 10:00) (59 - 95)    ABP: --    ABP(mean): --    RR: 28 (07 Oct 2019 10:00) (22 - 32)    SpO2: 93% (07 Oct 2019 10:00) (87% - 95%)        HEENT: PRRL EOMI        MOUTH/TEETH/GUMS: Clear        NECK: no JVD        LUNGS: decreased BS at bases, rhonchi, rales at bases        HEART: S1,S2 RR        ABDOMEN: soft nontender        EXTREMITIES:  no pedal edema        MUSCULOSKELETAL: arthritic changes.         NEURO: no tremor, no focal signs.        SKIN: no rash        : CVA negative,         CT Chest : Extensive consolidation of the left lung with more mild     consolidation at the right lung base. This could represent extensive     pneumonia. A more aggressive process such as underlying abscess or     perhaps necrosis would be difficult to exclude on the left given the lack     of IV contrast. (05 Oct 2019 17:30)< from: CT Chest No Cont (10.05.19 @ 14:52) >        Impression: Extensive consolidation of the left lung with more mild     consolidation at the right lung base. This could represent extensive     pneumonia. A more aggressive process such as underlying abscess or     perhaps necrosis would be difficult to exclude on the left given the lack     of IV contrast.· Assessment        a 89 yo female with Hx. of Dementia, CVA, DM, HLD, DVT who was sent to the ED from [facility] for severe dyspnea, hypoxia, lethargy, O2 Sat 74%. The patient was diagnosed with LLL pneumonia on CT and referred for inpatient IV AB.            #Sepsis/bacteremia secondary to extensive multifocal pna    #Toxic encephalopathy    - Was given 1 dose of aztreo in ED    - Elevated lactate-> s/p IVF now trending down however clincally deteriorated    - HCP wants full palliative/comfort care    - ID consult appreciated    - Start lasix 40 IVP, start morphine 2mg IV q6H for resp distress,     - Add Ativan 0.5mg IV q6H for agitation    - Robinul 0.2 Q6 H for increased resp secretions        #DM    - Hold oral diabetic meds    -FX Q6 H while NPO    - Low scale ISS        dc to hospice. TT discharge: 42 min

## 2019-10-07 NOTE — CONSULT NOTE ADULT - ASSESSMENT
Pt is a 89y old Female with hx of The patient is a 89 yo female with Hx. of Dementia, CVA, DM, HLD, DVT who was sent to the ED from [facility] for severe dyspnea, hypoxia, lethargy, O2 Sat 74%. The patient was diagnosed with LLL pneumonia on CT. Patient is unresponsive and contracted.   10/7/2019 patient seen and examined with no family ay bedside. Patient has BiPAP machine on, NAD noted at this time. Patient is unresponsive, increased respiratory rate.      1) Dyspnea  -LLL pneumonia   -CT scan of chest reviewed  -Morphine 2mg IV q2hrs PRN   -d/c BiPAP for comfort   -supplemental nasal O2  -Lasix 40mg IV push   -Robinol 0.2 IV q6hrs     2)LLL PNA   -d/c antibiotics as per son   -supportive care     3) Sepsis/bacteremia secondary to extensive PNA   - toxic encephalopathy   -supportive care     4) Dementia   -ativan 0.5mg IV q6hrs    5) Advanced directives   -HCP son Francisco   -Comfort MOLST -DNR/I, No Abx, No BiPAP No IV fluids, No blood draws, No sending to hospital, No feeding tube.  -Please refer to [facility] note     -Plan reviewed with RN, and Dr. Blanton

## 2019-10-07 NOTE — PROGRESS NOTE ADULT - SUBJECTIVE AND OBJECTIVE BOX
Patient is a 89y old  Female who presents with a chief complaint of Pneumonia       SUBJECTIVE:   HPI:  HPI:The patient is a 89 yo female with Hx. of Dementia, CVA, DM, HLD, DVT who was sent to the ED from [facility] for severe dyspnea, hypoxia, lethargy, O2 Sat 74%. The patient was diagnosed with LLL pneumonia on CT and referred for inpatient IV AB.    PT cannot provide any information. Is on bipap, tachypneic and appears extremely weak. Spoke to HCP Francisco via phone, was made aware mother's condition is critical. I discussed option of comfort management Will try abx and cont bipap, adding morphine, ativan, lasix and robinul. IF no improvement and appears worse or unchanged in next 24 hours, he would like to consider pure comfort management MOLST signed and in chart. TT discussing GOC : 25 min.     sub: patient obtunded with increased RR. WBC 24. Son agree to inpt hospice as patient continues to deteriorate clinically.       PMHx:  Dementia, CVA, DM, HLD, DVT    PSHx: N/A     Family Hx: not obtainable secondary to dementia.    Social Hx.: not smoking, no alcohol use    ROS: not obtainable secondary to dementia.        ICU Vital Signs Last 24 Hrs  T(C): 37.6 (07 Oct 2019 11:30), Max: 37.6 (06 Oct 2019 16:00)  T(F): 99.6 (07 Oct 2019 11:30), Max: 99.7 (06 Oct 2019 16:00)  HR: 102 (07 Oct 2019 10:00) (96 - 113)  BP: 140/65 (07 Oct 2019 10:00) (91/63 - 147/70)  BP(mean): 83 (07 Oct 2019 10:00) (54 - 95)  ABP: --  ABP(mean): --  RR: 28 (07 Oct 2019 10:00) (22 - 36)  SpO2: 93% (07 Oct 2019 10:00) (87% - 95%)          HEENT: PRRL EOMI    MOUTH/TEETH/GUMS: Clear    NECK: no JVD    LUNGS: decreased BS at bases, rhonchi, rales at bases    HEART: S1,S2 RR    ABDOMEN: soft nontender    EXTREMITIES:  no pedal edema    MUSCULOSKELETAL: arthritic changes.     NEURO: no tremor, no focal signs.    SKIN: no rash    : CVA negative,     Lab:                       11.6   7.75  )-----------( 353      ( 05 Oct 2019 13:47 )             36.1       10-05    142  |  108  |  62<H>  ----------------------------<  181<H>  4.5   |  24  |  1.55<H>    Ca    8.9      05 Oct 2019 13:47    TPro  6.1  /  Alb  1.6<L>  /  TBili  0.3  /  DBili  x   /  AST  43<H>  /  ALT  29  /  AlkPhos  79  10-05    UA 11-25 wbc,  RVP neg,     CT Chest : Extensive consolidation of the left lung with more mild   consolidation at the right lung base. This could represent extensive   pneumonia. A more aggressive process such as underlying abscess or   perhaps necrosis would be difficult to exclude on the left given the lack   of IV contrast. (05 Oct 2019 17:30)            LABS: All Labs Reviewed:        Blood Culture: 10-05 @ 13:47  Organism Blood Culture PCR  Gram Stain Blood -- Gram Stain   Growth in anaerobic bottle: Gram Positive Cocci in Pairs and Chains  Growth in aerobic bottle: Gram Positive Cocci in Pairs and Chains  Specimen Source .Blood None  Culture-Blood --        RADIOLOGY/EKG:  < from: CT Chest No Cont (10.05.19 @ 14:52) >    Impression: Extensive consolidation of the left lung with more mild   consolidation at the right lung base. This could represent extensive   pneumonia. A more aggressive process such as underlying abscess or   perhaps necrosis would be difficult to exclude on the left given the lack   of IV contrast.    < end of copied text >

## 2019-10-07 NOTE — CONSULT NOTE ADULT - SUBJECTIVE AND OBJECTIVE BOX
HPI: Pt is a 89y old Female with hx of The patient is a 89 yo female with Hx. of Dementia, CVA, DM, HLD, DVT who was sent to the ED from [facility] for severe dyspnea, hypoxia, lethargy, O2 Sat 74%. The patient was diagnosed with LLL pneumonia on CT. Patient is unresponsive and contracted.   10/7/2019 patient seen and examined with no family ay bedside. Patient has BiPAP machine on, NAD noted at this time. Patient is unresponsive, increased respiratory rate.        PAIN: ( )Yes   (x )No  DYSPNEA: (x) Yes  ( ) No  Level: moderate     PAST MEDICAL & SURGICAL HISTORY:  HLD (hyperlipidemia)  DM (diabetes mellitus)  Dementia      SOCIAL HX:    Hx opiate tolerance ( )YES  (x)NO    Baseline ADLs  (Prior to Admission)  ( ) Independent   ( x)Dependent    FAMILY HISTORY:  unable to obtain     Review of Systems:    Unable to obtain/Limited due to: unresponsiveness       PHYSICAL EXAM:    Vital Signs Last 24 Hrs  T(C): 37.6 (07 Oct 2019 03:00), Max: 37.6 (06 Oct 2019 16:00)  T(F): 99.7 (07 Oct 2019 03:00), Max: 99.7 (06 Oct 2019 16:00)  HR: 102 (07 Oct 2019 10:00) (96 - 121)  BP: 140/65 (07 Oct 2019 10:00) (91/63 - 147/70)  BP(mean): 83 (07 Oct 2019 10:00) (54 - 95)  RR: 28 (07 Oct 2019 10:00) (22 - 40)  SpO2: 93% (07 Oct 2019 10:00) (87% - 95%)  Daily Weight in k (06 Oct 2019 11:23)    PPSV2: 10%  FAST: unable to assess     General: elderly contracted female in bed, Bipap in place, NAD noted   Mental Status: unresponsive   HEENT: unable to assess   Lungs: crackles throughout b/l lung fields   Cardiac: S1S2 present, tachycardia   GI: non distended, + BS   : incontinent   Ext: edema in upper and lower extremities , worse on right side   Neuro: unable to assess       LABS:                        10.5   24.62 )-----------( 161      ( 07 Oct 2019 06:06 )             35.0     10-07    149<H>  |  117<H>  |  77<H>  ----------------------------<  99  4.1   |  16<L>  |  1.69<H>    Ca    8.5      07 Oct 2019 06:06    TPro  6.1  /  Alb  1.6<L>  /  TBili  0.3  /  DBili  x   /  AST  43<H>  /  ALT  29  /  AlkPhos  79  10-05    PT/INR - ( 05 Oct 2019 13:47 )   PT: 17.3 sec;   INR: 1.54 ratio         PTT - ( 05 Oct 2019 13:47 )  PTT:28.9 sec  Albumin: Albumin, Serum: 1.6 g/dL (10-05 @ 13:47)      Allergies: penicillins (Unknown)    Intolerances      MEDICATIONS  (STANDING):  ALBUTerol    90 MICROgram(s) HFA Inhaler 1 Puff(s) Inhalation every 4 hours  ALBUTerol/ipratropium for Nebulization 3 milliLiter(s) Nebulizer every 6 hours  ALBUTerol/ipratropium for Nebulization. 3 milliLiter(s) Nebulizer once  ALBUTerol/ipratropium for Nebulization. 3 milliLiter(s) Nebulizer once  glycopyrrolate Injectable 0.2 milliGRAM(s) IV Push every 6 hours    MEDICATIONS  (PRN):  acetaminophen   Tablet .. 650 milliGRAM(s) Oral every 6 hours PRN Temp greater or equal to 38C (100.4F), Mild Pain (1 - 3)  furosemide   Injectable 40 milliGRAM(s) IV Push daily PRN resp distress  LORazepam   Injectable 0.5 milliGRAM(s) IntraMuscular every 6 hours PRN Anxiety  morphine  - Injectable 2 milliGRAM(s) IV Push every 2 hours PRN dyspnea  traMADol 50 milliGRAM(s) Oral daily PRN Moderate Pain (4 - 6)      RADIOLOGY/ADDITIONAL STUDIES:  < from: CT Chest No Cont (10.05.19 @ 14:52) >  EXAM:  CT CHEST                            PROCEDURE DATE:  10/05/2019          INTERPRETATION:  Clinical history: Cough     There is extensive consolidation of the left lung involving the majority   of the lung with air locules seen within the consolidation. This could   represent extensive pneumonia. More mild consolidative changes seen at   the right lung base. Evaluation of the mediastinum and hilar regions are   limited without IV contrast. No pericardial or obvious pleural effusion   seen.    Impression: Extensive consolidation of the left lung with more mild   consolidation at the right lung base. This could represent extensive   pneumonia. A more aggressive process such as underlying abscess or   perhaps necrosis would be difficult to exclude on the left given the lack   of IV contrast.      < end of copied text >  < from: Xray Chest 1 View-PORTABLE IMMEDIATE (10.05.19 @ 13:40) >  EXAM:  XR CHEST PORTABLE IMMED 1V                            PROCEDURE DATE:  10/05/2019          INTERPRETATION:  Exam Date: 10/5/2019 1:40 PM    History: Sepsis    Technique: Single frontal portable view of the chest with comparison to    2019    Findings:    Heart is enlarged. Interval involvement of a moderate left pleural   effusion with left sided airspace disease may represent atelectasis and   or pneumonia. Apices unremarkable. Multiple right-sided rib fractures.   Degenerative changes of the visualized osseous structures.        Impression:    Interval involvement of a moderate left pleural effusion with left sided   airspace disease may represent atelectasis and or pneumonia.    < end of copied text >

## 2019-10-07 NOTE — GOALS OF CARE CONVERSATION - ADVANCED CARE PLANNING - TREATMENT GUIDELINES
DNR Order/Do not re-hospitalize/No IV fluids/No blood draws/No artificial nutrition/No antibiotics/Comfort measures only

## 2019-10-08 VITALS — RESPIRATION RATE: 32 BRPM | HEART RATE: 98 BPM | OXYGEN SATURATION: 83 %

## 2019-10-08 RX ADMIN — ROBINUL 0.2 MILLIGRAM(S): 0.2 INJECTION INTRAMUSCULAR; INTRAVENOUS at 06:29

## 2019-10-08 RX ADMIN — ROBINUL 0.2 MILLIGRAM(S): 0.2 INJECTION INTRAMUSCULAR; INTRAVENOUS at 00:09

## 2019-10-08 RX ADMIN — MORPHINE SULFATE 2 MILLIGRAM(S): 50 CAPSULE, EXTENDED RELEASE ORAL at 00:09

## 2019-10-08 RX ADMIN — MORPHINE SULFATE 2 MILLIGRAM(S): 50 CAPSULE, EXTENDED RELEASE ORAL at 04:07

## 2019-10-08 RX ADMIN — Medication 3 MILLILITER(S): at 07:56

## 2019-10-08 RX ADMIN — Medication 3 MILLILITER(S): at 13:30

## 2019-10-08 RX ADMIN — MORPHINE SULFATE 2 MILLIGRAM(S): 50 CAPSULE, EXTENDED RELEASE ORAL at 00:24

## 2019-10-08 NOTE — PROGRESS NOTE ADULT - SUBJECTIVE AND OBJECTIVE BOX
Patient is a 89y old  Female who presents with a chief complaint of Pneumonia       SUBJECTIVE:   HPI:  HPI:The patient is a 89 yo female with Hx. of Dementia, CVA, DM, HLD, DVT who was sent to the ED from [facility] for severe dyspnea, hypoxia, lethargy, O2 Sat 74%. The patient was diagnosed with LLL pneumonia on CT and referred for inpatient IV AB. Hospital course ntoable for worsening respiratory status. Family decided on inpatient hospice.     10/8: pt seen and examined earlier today. Lethargic. minimally responsive.     ROS: unable to obtain d/t lethargy    Vital Signs Last 24 Hrs  T(C): 37.2 (08 Oct 2019 15:56), Max: 37.2 (08 Oct 2019 15:56)  T(F): 98.9 (08 Oct 2019 15:56), Max: 98.9 (08 Oct 2019 15:56)  HR: 97 (08 Oct 2019 13:32) (97 - 115)  BP: 142/91 (08 Oct 2019 00:00) (142/91 - 142/91)  BP(mean): 103 (08 Oct 2019 00:00) (103 - 103)  RR: 31 (08 Oct 2019 04:00) (31 - 31)  SpO2: 84% (08 Oct 2019 13:32) (78% - 85%)  PHYSICAL EXAM:    GENERAL: obtunded, minimally responsive.   HEAD:  Normocephalic, atraumatic  EYES: eyes closed  HEENT: dry mucous membranes  NECK: Supple, No JVD  NERVOUS SYSTEM:  obtunded  CHEST/LUNG: Clear to auscultation bilaterally  HEART: Regular rate and rhythm  ABDOMEN: Soft, Nontender, Nondistended, Bowel sounds present  GENITOURINARY: Voiding, no palpable bladder  EXTREMITIES:   No clubbing, cyanosis,+anasarca  MUSCULOSKELeTAL- No muscle tenderness, no joint tenderness  SKIN-no rash    LABS:                        10.5   24.62 )-----------( 161      ( 07 Oct 2019 06:06 )             35.0     10-07    149<H>  |  117<H>  |  77<H>  ----------------------------<  99  4.1   |  16<L>  |  1.69<H>    Ca    8.5      07 Oct 2019 06:06            CT Chest : Extensive consolidation of the left lung with more mild   consolidation at the right lung base. This could represent extensive   pneumonia. A more aggressive process such as underlying abscess or   perhaps necrosis would be difficult to exclude on the left given the lack   of IV contrast. (05 Oct 2019 17:30)      Blood Culture: 10-05 @ 13:47  Organism Blood Culture PCR  Gram Stain Blood -- Gram Stain   Growth in anaerobic bottle: Gram Positive Cocci in Pairs and Chains  Growth in aerobic bottle: Gram Positive Cocci in Pairs and Chains  Specimen Source .Blood None  Culture-Blood --        RADIOLOGY/EKG:  < from: CT Chest No Cont (10.05.19 @ 14:52) >    Impression: Extensive consolidation of the left lung with more mild   consolidation at the right lung base. This could represent extensive   pneumonia. A more aggressive process such as underlying abscess or   perhaps necrosis would be difficult to exclude on the left given the lack   of IV contrast.    < end of copied text >

## 2019-10-08 NOTE — PROGRESS NOTE ADULT - SUBJECTIVE AND OBJECTIVE BOX
HPI: Pt is a 89y old Female with hx of The patient is a 89 yo female with Hx. of Dementia, CVA, DM, HLD, DVT who was sent to the ED from [facility] for severe dyspnea, hypoxia, lethargy, O2 Sat 74%. The patient was diagnosed with LLL pneumonia on CT. Patient is unresponsive and contracted.   10/8/2019 patient seen and examined with no family at bedside. Patient unresponsive, NAD appears comfortable at this time.       PAIN: ( )Yes   (x )No  DYSPNEA: (x) Yes  ( ) No  Level: moderate     Review of Systems:    Unable to obtain/Limited due to: unresponsiveness    PHYSICAL EXAM:    Vital Signs Last 24 Hrs  T(C): 37.6 (07 Oct 2019 11:30), Max: 37.6 (07 Oct 2019 11:30)  T(F): 99.6 (07 Oct 2019 11:30), Max: 99.6 (07 Oct 2019 11:30)  HR: 105 (08 Oct 2019 07:59) (102 - 115)  BP: 142/91 (08 Oct 2019 00:00) (140/65 - 142/91)  BP(mean): 103 (08 Oct 2019 00:00) (83 - 103)  RR: 31 (08 Oct 2019 04:00) (28 - 31)  SpO2: 85% (08 Oct 2019 07:59) (78% - 93%)  Daily     Daily     PPSV2: 10 %    General: elderly contracted female in bed, Bipap in place, NAD noted   Mental Status: unresponsive   HEENT: unable to assess   Lungs: crackles throughout b/l lung fields   Cardiac: S1S2 present, tachycardia   GI: non distended, + BS   : incontinent   Ext: edema in upper and lower extremities , worse on right side   Neuro: unable to assess       LABS:                        10.5   24.62 )-----------( 161      ( 07 Oct 2019 06:06 )             35.0     10-07    149<H>  |  117<H>  |  77<H>  ----------------------------<  99  4.1   |  16<L>  |  1.69<H>    Ca    8.5      07 Oct 2019 06:06        Albumin: Albumin, Serum: 1.6 g/dL (10-05 @ 13:47)      Allergies: penicillins (Unknown)    Intolerances      MEDICATIONS  (STANDING):  ALBUTerol    90 MICROgram(s) HFA Inhaler 1 Puff(s) Inhalation every 4 hours  ALBUTerol/ipratropium for Nebulization 3 milliLiter(s) Nebulizer every 6 hours  ALBUTerol/ipratropium for Nebulization. 3 milliLiter(s) Nebulizer once  ALBUTerol/ipratropium for Nebulization. 3 milliLiter(s) Nebulizer once  glycopyrrolate Injectable 0.2 milliGRAM(s) IV Push every 6 hours    MEDICATIONS  (PRN):  acetaminophen   Tablet .. 650 milliGRAM(s) Oral every 6 hours PRN Temp greater or equal to 38C (100.4F), Mild Pain (1 - 3)  furosemide   Injectable 40 milliGRAM(s) IV Push daily PRN resp distress  LORazepam   Injectable 0.5 milliGRAM(s) IntraMuscular every 6 hours PRN Anxiety  morphine  - Injectable 2 milliGRAM(s) IV Push every 1 hour PRN dyspnea  morphine  - Injectable 4 milliGRAM(s) IV Push every 1 hour PRN severe dyspnea  traMADol 50 milliGRAM(s) Oral daily PRN Moderate Pain (4 - 6)

## 2019-10-08 NOTE — PROGRESS NOTE ADULT - ASSESSMENT
89 y/o female with h/o dementia, old CVA, DM, HLD, DVT was admitted on 10/5 from [facility] for worsening dyspnea, hypoxia, lethargy, O2 Sat 74%. The patient was diagnosed with LLL pneumonia on CT and referred for inpatient IV AB. In ER she was noted with low grade fever and received aztreonam and vancomycin IV.     1. Febrile syndrome. Sepsis with STPN. Acute respiratory failure. Multilobar pneumonia with extensive pulmonary infiltrates. CRF stage 3. Allergy to PCN.  -BC x 2 noted  -HCP decided to proceed with comfort care  -vancomycin level high  -abx choice d/w son in derrick of her critical condition and h/o PCN allergy  -s/p ceftriaxone 2 gm IV qd and vancomycin 500 mg IV q12h  -respiratory care  -no further abx per HCP  -monitor temps  -supportive care  2. Other issues:   -care per medicine    Will sign off. Please reconsult prn.
89 yo female with Hx. of Dementia, CVA, DM, HLD, DVT who was sent to the ED from [facility] for severe dyspnea, hypoxia, lethargy, O2 Sat 74%. The patient was diagnosed with LLL pneumonia on CT and referred for inpatient IV AB.      #Sepsis/bacteremia secondary to extensive multifocal pna  #Toxic encephalopathy  - Was given 1 dose of aztreo in ED  - Elevated lactate-> s/p IVF now trending down however clincally deteriorated  - HCP wants full palliative/comfort care  - ID consult appreciated  -  lasix 40 IVP, morphine 2mg IV q6H for resp distress,   - Ativan 0.5mg IV q6H for agitation  - Robinul 0.2 Q6 H for increased resp secretions  - Duonebs        #DM-dc meds.    #DVT  -eliquis    #dispo:  inpatient hospice today.
Pt is a 89y old Female with hx of The patient is a 89 yo female with Hx. of Dementia, CVA, DM, HLD, DVT who was sent to the ED from [facility] for severe dyspnea, hypoxia, lethargy, O2 Sat 74%. The patient was diagnosed with LLL pneumonia on CT. Patient is unresponsive and contracted.     1) Dyspnea  -LLL pneumonia   -CT scan of chest reviewed  -Morphine 2mg IV q1hrs PRN   -Morphine 4mg IV q1hrs PRN for severe Dyspnea  -d/c BiPAP for comfort   -supplemental nasal O2  -Lasix 40mg IV push   -Robinol 0.2 IV q6hrs     2)LLL PNA   -d/c antibiotics as per son   -supportive care     3) Sepsis/bacteremia secondary to extensive PNA   -toxic encephalopathy   -supportive care     4) Dementia   -ativan 0.5mg IV q6hrs    5) Advanced directives   -HCP son Francisco   -Comfort MOLST -DNR/I, No Abx, No BiPAP No IV fluids, No blood draws, No sending to hospital, No feeding tube.  -Please refer to GOC note     -Plan reviewed with RN
a 89 yo female with Hx. of Dementia, CVA, DM, HLD, DVT who was sent to the ED from [facility] for severe dyspnea, hypoxia, lethargy, O2 Sat 74%. The patient was diagnosed with LLL pneumonia on CT and referred for inpatient IV AB.      #Sepsis/bacteremia secondary to extensive multifocal pna  #Toxic encephalopathy  - Was given 1 dose of aztreo in ED  - Elevated lactate-> s/p IVF now trending down however clincally deteriorated  - HCP wants full palliative/comfort care  - ID consult appreciated  - Start lasix 40 IVP, start morphine 2mg IV q6H for resp distress,   - Add Ativan 0.5mg IV q6H for agitation  - Robinul 0.2 Q6 H for increased resp secretions  - Duonebs        #DM  - Hold oral diabetic meds  -FX Q6 H while NPO  - Low scale ISS    #DVT  - Cont eliquis      DNR/DNI    TT during physical exam: 55 min  GOC discussion time: add'l 17 min
a 89 yo female with Hx. of Dementia, CVA, DM, HLD, DVT who was sent to the ED from [facility] for severe dyspnea, hypoxia, lethargy, O2 Sat 74%. The patient was diagnosed with LLL pneumonia on CT and referred for inpatient IV AB.      #Sepsis/bacteremia secondary to extensive multifocal pna  #Toxic encephalopathy  - Was given 1 dose of aztreo in ED  - Elevated lactate-> s/p IVF now trending down  - ID consult appreciated  - Rotated to vanco/ceftriaxone for suspected GN sadiq and other atypical organisms  - Blood cultures  - Cont bipap/npo  - Start lasix 40 IVP, start morphine 2mg IV q6H for resp distress,   - Add Ativan 0.5mg IV q6H for agitation  - Robinul 0.2 Q6 H for increased resp secretions  - Duonebs        #DM  - Hold oral diabetic meds  -FX Q6 H while NPO  - Low scale ISS    #DVT  - Cont eliquis      DNR/DNI    TT during physical exam: 55 min  GOC discussion time: add'l 25 min
Pt situation discussed w SHAWNEE Thomas and w nursing staff    Brief chart review   Pt is unable to cooperative w interview and exam  admitted from [facility] w acute resp failure w extensive PNA L>R  DNR nonhospital accompanied pt    VSS as above  wearing BiPAP for 15-20 minutes at the time of my examination  Pt w posture listing to L and head forward flexed  + unlabored respirations  Chest breath sounds reduced L side>R  +rhonchi bilateral, no rales or wheezing  Cor RR 88\  Abd + bowel sounds, soft  Extrem no edema  VASC nl cap refill  rectal temp 101    A/P  bilateral PNA  sepsis   acute resp failure, hypoxemic  trial of BiPAP tolerated w/o chnage in pulse ox  1. called HCP cell and left voicemail 23:07  2. land line nonoperative  3. from review of DNR at [facility] and review of pt's living will, artificial resp not desired therfore DNI  4. transfer to SSD  5. continuous pulse ox  6. lactate and repeat ABG  7. IV fluid bolus  8. tylenol  9. reassess    prognosis appears poor

## 2019-10-08 NOTE — DISCHARGE NOTE NURSING/CASE MANAGEMENT/SOCIAL WORK - PATIENT PORTAL LINK FT
You can access the FollowMyHealth Patient Portal offered by [facility] by registering at the following website: [email or web address]. By joining [employer] [facility]’s FollowMyHealth portal, you will also be able to view your health information using other applications (apps) compatible with our system.

## 2019-10-14 DIAGNOSIS — A40.9 STREPTOCOCCAL SEPSIS, UNSPECIFIED: ICD-10-CM

## 2019-10-14 DIAGNOSIS — Z79.01 LONG TERM (CURRENT) USE OF ANTICOAGULANTS: ICD-10-CM

## 2019-10-14 DIAGNOSIS — Z86.718 PERSONAL HISTORY OF OTHER VENOUS THROMBOSIS AND EMBOLISM: ICD-10-CM

## 2019-10-14 DIAGNOSIS — G92 TOXIC ENCEPHALOPATHY: ICD-10-CM

## 2019-10-14 DIAGNOSIS — F03.90 UNSPECIFIED DEMENTIA WITHOUT BEHAVIORAL DISTURBANCE: ICD-10-CM

## 2019-10-14 DIAGNOSIS — E11.22 TYPE 2 DIABETES MELLITUS WITH DIABETIC CHRONIC KIDNEY DISEASE: ICD-10-CM

## 2019-10-14 DIAGNOSIS — J15.4 PNEUMONIA DUE TO OTHER STREPTOCOCCI: ICD-10-CM

## 2019-10-14 DIAGNOSIS — Z66 DO NOT RESUSCITATE: ICD-10-CM

## 2019-10-14 DIAGNOSIS — Z51.5 ENCOUNTER FOR PALLIATIVE CARE: ICD-10-CM

## 2019-10-14 DIAGNOSIS — Z86.73 PERSONAL HISTORY OF TRANSIENT ISCHEMIC ATTACK (TIA), AND CEREBRAL INFARCTION WITHOUT RESIDUAL DEFICITS: ICD-10-CM

## 2019-10-14 DIAGNOSIS — E78.5 HYPERLIPIDEMIA, UNSPECIFIED: ICD-10-CM

## 2019-10-14 DIAGNOSIS — J96.01 ACUTE RESPIRATORY FAILURE WITH HYPOXIA: ICD-10-CM

## 2019-10-14 DIAGNOSIS — N18.3 CHRONIC KIDNEY DISEASE, STAGE 3 (MODERATE): ICD-10-CM

## 2019-10-14 DIAGNOSIS — Z79.82 LONG TERM (CURRENT) USE OF ASPIRIN: ICD-10-CM
